# Patient Record
Sex: FEMALE | Race: WHITE | Employment: FULL TIME | URBAN - NONMETROPOLITAN AREA
[De-identification: names, ages, dates, MRNs, and addresses within clinical notes are randomized per-mention and may not be internally consistent; named-entity substitution may affect disease eponyms.]

---

## 2017-03-24 ENCOUNTER — OFFICE VISIT (OUTPATIENT)
Dept: FAMILY MEDICINE CLINIC | Age: 37
End: 2017-03-24

## 2017-03-24 VITALS
RESPIRATION RATE: 16 BRPM | SYSTOLIC BLOOD PRESSURE: 124 MMHG | DIASTOLIC BLOOD PRESSURE: 76 MMHG | WEIGHT: 250 LBS | HEART RATE: 72 BPM | BODY MASS INDEX: 38.58 KG/M2

## 2017-03-24 DIAGNOSIS — E03.9 ACQUIRED HYPOTHYROIDISM: Primary | ICD-10-CM

## 2017-03-24 DIAGNOSIS — R51.9 ACUTE NONINTRACTABLE HEADACHE, UNSPECIFIED HEADACHE TYPE: ICD-10-CM

## 2017-03-24 LAB
T4 FREE: 0.4 NG/DL (ref 0.9–1.8)
TSH REFLEX: 52.54 UIU/ML (ref 0.27–4.2)

## 2017-03-24 PROCEDURE — 99213 OFFICE O/P EST LOW 20 MIN: CPT | Performed by: NURSE PRACTITIONER

## 2017-03-24 PROCEDURE — 36415 COLL VENOUS BLD VENIPUNCTURE: CPT | Performed by: NURSE PRACTITIONER

## 2017-03-24 PROCEDURE — 96372 THER/PROPH/DIAG INJ SC/IM: CPT | Performed by: NURSE PRACTITIONER

## 2017-03-24 RX ORDER — KETOROLAC TROMETHAMINE 30 MG/ML
60 INJECTION, SOLUTION INTRAMUSCULAR; INTRAVENOUS ONCE
Status: COMPLETED | OUTPATIENT
Start: 2017-03-24 | End: 2017-03-24

## 2017-03-24 RX ORDER — ONDANSETRON HYDROCHLORIDE 8 MG/1
8 TABLET, FILM COATED ORAL DAILY PRN
Qty: 30 TABLET | Refills: 0 | Status: CANCELLED | OUTPATIENT
Start: 2017-03-24

## 2017-03-24 RX ADMIN — KETOROLAC TROMETHAMINE 60 MG: 30 INJECTION, SOLUTION INTRAMUSCULAR; INTRAVENOUS at 15:54

## 2017-03-24 ASSESSMENT — ENCOUNTER SYMPTOMS
PHOTOPHOBIA: 0
EYES NEGATIVE: 1
NAUSEA: 0
GASTROINTESTINAL NEGATIVE: 1
DIARRHEA: 0
VOMITING: 0
RESPIRATORY NEGATIVE: 1
CONSTIPATION: 0

## 2017-03-27 DIAGNOSIS — E03.9 ACQUIRED HYPOTHYROIDISM: ICD-10-CM

## 2017-03-27 RX ORDER — LEVOTHYROXINE SODIUM 0.15 MG/1
150 TABLET ORAL DAILY
Qty: 90 TABLET | Refills: 1 | Status: SHIPPED | OUTPATIENT
Start: 2017-03-27 | End: 2017-05-23 | Stop reason: SDUPTHER

## 2017-05-19 DIAGNOSIS — E03.9 ACQUIRED HYPOTHYROIDISM: ICD-10-CM

## 2017-05-19 LAB — TSH REFLEX: 0.05 UIU/ML (ref 0.27–4.2)

## 2017-05-20 LAB — T4 FREE: 1.8 NG/DL (ref 0.9–1.8)

## 2017-05-23 DIAGNOSIS — E03.9 ACQUIRED HYPOTHYROIDISM: ICD-10-CM

## 2017-05-23 RX ORDER — LEVOTHYROXINE SODIUM 137 UG/1
150 TABLET ORAL DAILY
Qty: 30 TABLET | Refills: 3 | Status: SHIPPED | OUTPATIENT
Start: 2017-05-23 | End: 2017-11-01 | Stop reason: SDUPTHER

## 2017-07-24 ENCOUNTER — TELEPHONE (OUTPATIENT)
Dept: FAMILY MEDICINE CLINIC | Age: 37
End: 2017-07-24

## 2017-09-25 DIAGNOSIS — E03.9 ACQUIRED HYPOTHYROIDISM: ICD-10-CM

## 2017-09-25 RX ORDER — LEVOTHYROXINE SODIUM 0.15 MG/1
TABLET ORAL
Qty: 30 TABLET | Refills: 1 | OUTPATIENT
Start: 2017-09-25

## 2017-11-01 DIAGNOSIS — E03.9 ACQUIRED HYPOTHYROIDISM: ICD-10-CM

## 2017-11-01 RX ORDER — LEVOTHYROXINE SODIUM 137 UG/1
150 TABLET ORAL DAILY
Qty: 30 TABLET | Refills: 0 | Status: SHIPPED | OUTPATIENT
Start: 2017-11-01 | End: 2017-12-10 | Stop reason: SDUPTHER

## 2017-11-01 NOTE — TELEPHONE ENCOUNTER
Patient needs to come in and have a TSH drawn sometime in the next 1-2 weeks.   She has not had this done since May

## 2017-11-21 DIAGNOSIS — E03.9 ACQUIRED HYPOTHYROIDISM: ICD-10-CM

## 2017-11-21 LAB
T4 FREE: 1.1 NG/DL (ref 0.9–1.8)
TSH REFLEX: 2.77 UIU/ML (ref 0.27–4.2)

## 2017-12-01 ENCOUNTER — OFFICE VISIT (OUTPATIENT)
Dept: FAMILY MEDICINE CLINIC | Age: 37
End: 2017-12-01

## 2017-12-01 VITALS
HEART RATE: 82 BPM | DIASTOLIC BLOOD PRESSURE: 66 MMHG | BODY MASS INDEX: 38.74 KG/M2 | OXYGEN SATURATION: 98 % | SYSTOLIC BLOOD PRESSURE: 102 MMHG | WEIGHT: 240 LBS | RESPIRATION RATE: 14 BRPM

## 2017-12-01 DIAGNOSIS — Z23 FLU VACCINE NEED: ICD-10-CM

## 2017-12-01 DIAGNOSIS — F41.9 ANXIETY: Primary | ICD-10-CM

## 2017-12-01 PROCEDURE — 99213 OFFICE O/P EST LOW 20 MIN: CPT | Performed by: NURSE PRACTITIONER

## 2017-12-01 PROCEDURE — 90471 IMMUNIZATION ADMIN: CPT | Performed by: NURSE PRACTITIONER

## 2017-12-01 PROCEDURE — 90686 IIV4 VACC NO PRSV 0.5 ML IM: CPT | Performed by: NURSE PRACTITIONER

## 2017-12-01 RX ORDER — HYDROXYZINE PAMOATE 25 MG/1
25 CAPSULE ORAL 4 TIMES DAILY PRN
Qty: 120 CAPSULE | Refills: 1 | Status: SHIPPED | OUTPATIENT
Start: 2017-12-01 | End: 2018-09-04

## 2017-12-01 ASSESSMENT — ENCOUNTER SYMPTOMS
EYE REDNESS: 0
CHEST TIGHTNESS: 0
COUGH: 0
EYES NEGATIVE: 1
EYE PAIN: 0
SINUS PRESSURE: 0
GASTROINTESTINAL NEGATIVE: 1
RESPIRATORY NEGATIVE: 1
SORE THROAT: 0
COLOR CHANGE: 0
BACK PAIN: 0
EYE DISCHARGE: 0
SHORTNESS OF BREATH: 0
RHINORRHEA: 0

## 2017-12-01 ASSESSMENT — PATIENT HEALTH QUESTIONNAIRE - PHQ9
2. FEELING DOWN, DEPRESSED OR HOPELESS: 0
1. LITTLE INTEREST OR PLEASURE IN DOING THINGS: 0
SUM OF ALL RESPONSES TO PHQ QUESTIONS 1-9: 0
SUM OF ALL RESPONSES TO PHQ9 QUESTIONS 1 & 2: 0

## 2017-12-01 NOTE — PROGRESS NOTES
SUBJECTIVE:    Abdi Ear  1980  43 y.o.  female      Chief Complaint   Patient presents with    6 Month Follow-Up    Hypothyroidism    Flu Vaccine     requesting flu vaccine     HPI     The patient is here today with concern for \"panic attacks\". She reports that she is just had a lot going on. Her  has had a drug problem, their son recently let them know that he is depressed and suicidal.  She has had some chest pain and shortness of breath with a normal workup in the emergency department recently. She is having what feels like a foggy feeling and then some shortness of breath that escalates into \"full-blown panic\" she says it can last anywhere from 5-10 minutes to as long as an hour or 2. No other concerns at this time        No problem-specific Assessment & Plan notes found for this encounter. Review of Systems   Constitutional: Negative. Negative for activity change, appetite change, fatigue and fever. HENT: Negative. Negative for congestion, ear pain, rhinorrhea, sinus pressure, sneezing and sore throat. Eyes: Negative. Negative for pain, discharge and redness. Respiratory: Negative. Negative for cough, chest tightness and shortness of breath. Cardiovascular: Negative. Gastrointestinal: Negative. Endocrine: Negative. Genitourinary: Negative. Negative for difficulty urinating and dysuria. Musculoskeletal: Negative. Negative for back pain, joint swelling and myalgias. Skin: Negative. Negative for color change and rash. Neurological: Negative. Negative for dizziness, weakness and headaches. Hematological: Negative. Does not bruise/bleed easily. Psychiatric/Behavioral: Positive for sleep disturbance. Negative for dysphoric mood. The patient is nervous/anxious. All other systems reviewed and are negative.       OBJECTIVE:    /66 (Site: Right Arm, Position: Sitting, Cuff Size: Large Adult)   Pulse 82   Resp 14   Wt 240 lb (108.9 kg) LMP 10/13/2017   SpO2 98%   BMI 38.74 kg/m²     Physical Exam   Constitutional: She is oriented to person, place, and time. She appears well-developed and well-nourished. No distress. HENT:   Head: Normocephalic and atraumatic. Mouth/Throat: Oropharynx is clear and moist.   Eyes: Conjunctivae and EOM are normal. Pupils are equal, round, and reactive to light. Neck: Normal range of motion. Neck supple. Cardiovascular: Normal rate, regular rhythm, normal heart sounds and intact distal pulses. Pulmonary/Chest: Effort normal and breath sounds normal. No respiratory distress. Abdominal: Soft. Bowel sounds are normal. She exhibits no distension. There is no tenderness. Musculoskeletal: Normal range of motion. Neurological: She is alert and oriented to person, place, and time. She has normal reflexes. Skin: Skin is warm and dry. Psychiatric: She has a normal mood and affect. Her behavior is normal. Judgment and thought content normal.       ASSESSMENT/PLAN:    1. Flu vaccine need  Given today  - INFLUENZA, QUADV, 3 YRS AND OLDER, IM, MDV, 0.5ML (FLUZONE QUADV)    2. Anxiety  I discussed with Jorgewilbertmehnaz Lamardavid her symptoms. She is reluctant to be on a medication every day as she feels this is just an intermittent problem. We discussed the options for treatment of intermittent anxiety. We have elected not to consider benzodiazepines due to the concern for drug use in her household. She is agreeable to trying Vistaril. We discussed that she can use this at night to help her sleep and it may also provide her a feeling of wellness the next day as it will continue to lasted decrease her anxiety. She is to follow-up in one month, she will follow up sooner if she has any worsening symptoms or other new concerns. - hydrOXYzine (VISTARIL) 25 MG capsule; Take 1 capsule by mouth 4 times daily as needed for Anxiety  Dispense: 120 capsule; Refill: 1      No Follow-up on file.             (Please note that portions of this note may have been completed with a voice recognition program. Efforts were made to edit the dictations but occasionally words are mis-transcribed.)

## 2017-12-01 NOTE — PATIENT INSTRUCTIONS
Behavioral Health Resources  1. [facility]  [address] [facility]. [address]  [phone]    [address] [facility], [address]  [phone]    2. Positive Perspective  [facility]  [address] [facility]  [place], [address]  [phone]    3. [address] [facility], [address]  [address]  [facility]  [address]  [phone]      4. [facility]   [address] [facility], [address]      5. [address] [facility]. [place] [address] [phone]  Offers psychiatric services and counseling    6. [address] [facility],[address]  [phone]  Offers medical & psychiatric services and counseling    7. Child, [address]  [address] [phone]  Offers psychiatric services and counseling    8.  [address] [address] [address]  [phone]

## 2017-12-10 DIAGNOSIS — E03.9 ACQUIRED HYPOTHYROIDISM: ICD-10-CM

## 2017-12-12 RX ORDER — LEVOTHYROXINE SODIUM 137 UG/1
150 TABLET ORAL DAILY
Qty: 30 TABLET | Refills: 0 | Status: SHIPPED | OUTPATIENT
Start: 2017-12-12 | End: 2018-02-07 | Stop reason: SDUPTHER

## 2018-01-03 ENCOUNTER — OFFICE VISIT (OUTPATIENT)
Dept: FAMILY MEDICINE CLINIC | Age: 38
End: 2018-01-03

## 2018-01-03 VITALS
HEART RATE: 83 BPM | OXYGEN SATURATION: 98 % | RESPIRATION RATE: 15 BRPM | WEIGHT: 241 LBS | DIASTOLIC BLOOD PRESSURE: 74 MMHG | BODY MASS INDEX: 38.9 KG/M2 | SYSTOLIC BLOOD PRESSURE: 126 MMHG

## 2018-01-03 DIAGNOSIS — Z01.419 ENCOUNTER FOR GYNECOLOGICAL EXAMINATION WITHOUT ABNORMAL FINDING: Primary | ICD-10-CM

## 2018-01-03 PROCEDURE — 99395 PREV VISIT EST AGE 18-39: CPT | Performed by: NURSE PRACTITIONER

## 2018-01-03 ASSESSMENT — ENCOUNTER SYMPTOMS
WHEEZING: 0
SHORTNESS OF BREATH: 0
GASTROINTESTINAL NEGATIVE: 1
COUGH: 0
RESPIRATORY NEGATIVE: 1

## 2018-01-03 NOTE — PROGRESS NOTES
SUBJECTIVE:    Destiney Diaz  1980  42 y.o.  female      Chief Complaint   Patient presents with    Gynecologic Exam     HPI  Routine Gyn Exam: Patient here for routine exam.  Current Complaints: none. Personal Health Questionnaire Reviewed: not asked     Gynecologic History  Patient's last menstrual period was 2017 (within days). Contraception: tubal ligation  Last Pap: 1.5 years  Results: normal  Last Mammogram: na  Results: na    Obstetric History  : 6  Para: 3  AB: 2   x 1    No problem-specific Assessment & Plan notes found for this encounter. Review of Systems   Constitutional: Negative. Respiratory: Negative. Negative for cough, shortness of breath and wheezing. Cardiovascular: Negative. Negative for chest pain, palpitations and leg swelling. Gastrointestinal: Negative. Endocrine: Negative. Genitourinary: Negative. Negative for difficulty urinating, dyspareunia, genital sores, menstrual problem, pelvic pain, vaginal bleeding, vaginal discharge and vaginal pain. Neurological: Negative. All other systems reviewed and are negative. OBJECTIVE:    /74 (Site: Right Arm, Position: Sitting, Cuff Size: Large Adult)   Pulse 83   Resp 15   Wt 241 lb (109.3 kg)   LMP 2017 (Within Days)   SpO2 98%   BMI 38.90 kg/m²     Physical Exam   Constitutional: She is oriented to person, place, and time. She appears well-developed and well-nourished. HENT:   Head: Normocephalic. Cardiovascular: Normal rate, normal heart sounds and intact distal pulses. No murmur heard. Pulmonary/Chest: Effort normal and breath sounds normal. No respiratory distress. Abdominal: Soft. Bowel sounds are normal. She exhibits no distension and no mass. There is no tenderness. There is no rebound and no guarding. Hernia confirmed negative in the right inguinal area and confirmed negative in the left inguinal area.    Genitourinary: Vagina normal and uterus normal.

## 2018-01-04 LAB
CANDIDA SPECIES, DNA PROBE: NORMAL
GARDNERELLA VAGINALIS, DNA PROBE: NORMAL
TRICHOMONAS VAGINALIS DNA: NORMAL

## 2018-03-01 DIAGNOSIS — E03.9 ACQUIRED HYPOTHYROIDISM: ICD-10-CM

## 2018-03-01 RX ORDER — LEVOTHYROXINE SODIUM 137 UG/1
150 TABLET ORAL DAILY
Qty: 90 TABLET | Refills: 1 | Status: SHIPPED | OUTPATIENT
Start: 2018-03-01 | End: 2019-01-31 | Stop reason: SDUPTHER

## 2018-09-04 ENCOUNTER — OFFICE VISIT (OUTPATIENT)
Dept: FAMILY MEDICINE CLINIC | Age: 38
End: 2018-09-04

## 2018-09-04 VITALS
DIASTOLIC BLOOD PRESSURE: 68 MMHG | HEART RATE: 91 BPM | SYSTOLIC BLOOD PRESSURE: 102 MMHG | BODY MASS INDEX: 38.61 KG/M2 | OXYGEN SATURATION: 98 % | RESPIRATION RATE: 16 BRPM | WEIGHT: 239.2 LBS

## 2018-09-04 DIAGNOSIS — N92.6 MISSED PERIOD: Primary | ICD-10-CM

## 2018-09-04 DIAGNOSIS — E03.9 ACQUIRED HYPOTHYROIDISM: ICD-10-CM

## 2018-09-04 DIAGNOSIS — R53.83 FATIGUE, UNSPECIFIED TYPE: ICD-10-CM

## 2018-09-04 LAB
CONTROL: NORMAL
HCT VFR BLD CALC: 42.3 % (ref 36–48)
HEMOGLOBIN: 14.1 G/DL (ref 12–16)
MCH RBC QN AUTO: 30.9 PG (ref 26–34)
MCHC RBC AUTO-ENTMCNC: 33.3 G/DL (ref 31–36)
MCV RBC AUTO: 92.8 FL (ref 80–100)
PDW BLD-RTO: 14.2 % (ref 12.4–15.4)
PLATELET # BLD: 267 K/UL (ref 135–450)
PMV BLD AUTO: 9.9 FL (ref 5–10.5)
PREGNANCY TEST URINE, POC: NEGATIVE
RBC # BLD: 4.55 M/UL (ref 4–5.2)
TSH REFLEX: 0.62 UIU/ML (ref 0.27–4.2)
WBC # BLD: 7.4 K/UL (ref 4–11)

## 2018-09-04 PROCEDURE — 81025 URINE PREGNANCY TEST: CPT | Performed by: NURSE PRACTITIONER

## 2018-09-04 PROCEDURE — 99213 OFFICE O/P EST LOW 20 MIN: CPT | Performed by: NURSE PRACTITIONER

## 2018-09-04 ASSESSMENT — ENCOUNTER SYMPTOMS
ABDOMINAL PAIN: 0
WHEEZING: 0
SHORTNESS OF BREATH: 0
VOMITING: 1
RESPIRATORY NEGATIVE: 1
COUGH: 0
CONSTIPATION: 0
DIARRHEA: 0
NAUSEA: 1

## 2018-09-05 ENCOUNTER — TELEPHONE (OUTPATIENT)
Dept: FAMILY MEDICINE CLINIC | Age: 38
End: 2018-09-05

## 2018-09-05 DIAGNOSIS — N92.6 MISSED PERIOD: Primary | ICD-10-CM

## 2018-09-05 DIAGNOSIS — N91.2 AMENORRHEA: Primary | ICD-10-CM

## 2018-09-05 LAB — GONADOTROPIN, CHORIONIC (HCG) QUANT: <5 MIU/ML

## 2018-09-05 NOTE — TELEPHONE ENCOUNTER
[facility] per Ludmila's order-labs that were ordered yesterday were missed-called and spoke w/angel-requesting HCG quanitative to be added to lab that was drawn yesterday-she verbalizes understanding-she request the order be in epic and they will release order from there

## 2018-09-13 DIAGNOSIS — N91.2 AMENORRHEA: ICD-10-CM

## 2018-09-13 LAB
FOLLICLE STIMULATING HORMONE: 4.1 MIU/ML
GONADOTROPIN, CHORIONIC (HCG) QUANT: <5 MIU/ML
LUTEINIZING HORMONE: 9.7 MIU/ML

## 2018-09-18 ENCOUNTER — HOSPITAL ENCOUNTER (OUTPATIENT)
Dept: ULTRASOUND IMAGING | Age: 38
Discharge: HOME | End: 2018-09-18
Attending: NURSE PRACTITIONER | Admitting: NURSE PRACTITIONER

## 2018-09-18 DIAGNOSIS — N91.2 AMENORRHEA: ICD-10-CM

## 2019-01-31 DIAGNOSIS — E03.9 ACQUIRED HYPOTHYROIDISM: ICD-10-CM

## 2019-01-31 RX ORDER — LEVOTHYROXINE SODIUM 137 UG/1
150 TABLET ORAL DAILY
Qty: 90 TABLET | Refills: 1 | Status: SHIPPED | OUTPATIENT
Start: 2019-01-31 | End: 2019-08-28 | Stop reason: SDUPTHER

## 2019-02-21 ENCOUNTER — NURSE ONLY (OUTPATIENT)
Dept: FAMILY MEDICINE CLINIC | Age: 39
End: 2019-02-21

## 2019-02-21 DIAGNOSIS — Z23 NEED FOR IMMUNIZATION AGAINST PERTUSSIS: Primary | ICD-10-CM

## 2019-02-21 DIAGNOSIS — Z23 NEED FOR PROPHYLACTIC VACCINATION AND INOCULATION AGAINST INFLUENZA: ICD-10-CM

## 2019-02-21 PROCEDURE — 90471 IMMUNIZATION ADMIN: CPT | Performed by: NURSE PRACTITIONER

## 2019-02-21 PROCEDURE — 90686 IIV4 VACC NO PRSV 0.5 ML IM: CPT | Performed by: NURSE PRACTITIONER

## 2019-02-21 PROCEDURE — 90472 IMMUNIZATION ADMIN EACH ADD: CPT | Performed by: NURSE PRACTITIONER

## 2019-02-21 PROCEDURE — 90715 TDAP VACCINE 7 YRS/> IM: CPT | Performed by: NURSE PRACTITIONER

## 2019-08-28 DIAGNOSIS — E03.9 ACQUIRED HYPOTHYROIDISM: ICD-10-CM

## 2019-08-28 RX ORDER — LEVOTHYROXINE SODIUM 137 UG/1
150 TABLET ORAL DAILY
Qty: 90 TABLET | Refills: 1 | Status: SHIPPED | OUTPATIENT
Start: 2019-08-28 | End: 2020-02-07 | Stop reason: DRUGHIGH

## 2020-02-06 ENCOUNTER — OFFICE VISIT (OUTPATIENT)
Dept: FAMILY MEDICINE CLINIC | Age: 40
End: 2020-02-06

## 2020-02-06 VITALS
TEMPERATURE: 98.3 F | DIASTOLIC BLOOD PRESSURE: 80 MMHG | SYSTOLIC BLOOD PRESSURE: 120 MMHG | HEART RATE: 77 BPM | WEIGHT: 254.25 LBS | OXYGEN SATURATION: 95 % | RESPIRATION RATE: 16 BRPM | BODY MASS INDEX: 40.86 KG/M2 | HEIGHT: 66 IN

## 2020-02-06 LAB
A/G RATIO: 1.3 (ref 1.1–2.2)
ALBUMIN SERPL-MCNC: 4.3 G/DL (ref 3.4–5)
ALP BLD-CCNC: 62 U/L (ref 40–129)
ALT SERPL-CCNC: 27 U/L (ref 10–40)
ANION GAP SERPL CALCULATED.3IONS-SCNC: 12 MMOL/L (ref 3–16)
AST SERPL-CCNC: 24 U/L (ref 15–37)
BILIRUB SERPL-MCNC: 0.3 MG/DL (ref 0–1)
BUN BLDV-MCNC: 5 MG/DL (ref 7–20)
CALCIUM SERPL-MCNC: 9.3 MG/DL (ref 8.3–10.6)
CHLORIDE BLD-SCNC: 101 MMOL/L (ref 99–110)
CHOLESTEROL, FASTING: 212 MG/DL (ref 0–199)
CO2: 24 MMOL/L (ref 21–32)
CREAT SERPL-MCNC: 0.8 MG/DL (ref 0.6–1.1)
GFR AFRICAN AMERICAN: >60
GFR NON-AFRICAN AMERICAN: >60
GLOBULIN: 3.4 G/DL
GLUCOSE FASTING: 87 MG/DL (ref 70–99)
HDLC SERPL-MCNC: 31 MG/DL (ref 40–60)
LDL CHOLESTEROL CALCULATED: ABNORMAL MG/DL
LDL CHOLESTEROL DIRECT: 113 MG/DL
POTASSIUM SERPL-SCNC: 4.2 MMOL/L (ref 3.5–5.1)
SODIUM BLD-SCNC: 137 MMOL/L (ref 136–145)
T4 FREE: 0.4 NG/DL (ref 0.9–1.8)
TOTAL PROTEIN: 7.7 G/DL (ref 6.4–8.2)
TRIGLYCERIDE, FASTING: 340 MG/DL (ref 0–150)
TSH SERPL DL<=0.05 MIU/L-ACNC: 51.87 UIU/ML (ref 0.27–4.2)
VLDLC SERPL CALC-MCNC: ABNORMAL MG/DL

## 2020-02-06 PROCEDURE — 99213 OFFICE O/P EST LOW 20 MIN: CPT | Performed by: NURSE PRACTITIONER

## 2020-02-06 PROCEDURE — 36415 COLL VENOUS BLD VENIPUNCTURE: CPT | Performed by: NURSE PRACTITIONER

## 2020-02-06 RX ORDER — LEVOTHYROXINE SODIUM 137 UG/1
150 TABLET ORAL DAILY
Qty: 90 TABLET | Refills: 1 | Status: CANCELLED | OUTPATIENT
Start: 2020-02-06

## 2020-02-06 ASSESSMENT — PATIENT HEALTH QUESTIONNAIRE - PHQ9
SUM OF ALL RESPONSES TO PHQ QUESTIONS 1-9: 0
1. LITTLE INTEREST OR PLEASURE IN DOING THINGS: 0
2. FEELING DOWN, DEPRESSED OR HOPELESS: 0
SUM OF ALL RESPONSES TO PHQ9 QUESTIONS 1 & 2: 0
SUM OF ALL RESPONSES TO PHQ QUESTIONS 1-9: 0

## 2020-02-06 ASSESSMENT — ENCOUNTER SYMPTOMS
GASTROINTESTINAL NEGATIVE: 1
EYES NEGATIVE: 1
RESPIRATORY NEGATIVE: 1

## 2020-02-07 RX ORDER — LEVOTHYROXINE SODIUM 0.15 MG/1
150 TABLET ORAL DAILY
Qty: 30 TABLET | Refills: 1 | Status: SHIPPED | OUTPATIENT
Start: 2020-02-07 | End: 2020-03-02

## 2020-03-02 RX ORDER — LEVOTHYROXINE SODIUM 0.15 MG/1
TABLET ORAL
Qty: 30 TABLET | Refills: 1 | Status: SHIPPED | OUTPATIENT
Start: 2020-03-02 | End: 2020-03-19 | Stop reason: SDUPTHER

## 2020-03-19 RX ORDER — LEVOTHYROXINE SODIUM 0.15 MG/1
TABLET ORAL
Qty: 90 TABLET | Refills: 0 | Status: SHIPPED | OUTPATIENT
Start: 2020-03-19 | End: 2020-06-26

## 2020-06-26 RX ORDER — LEVOTHYROXINE SODIUM 0.15 MG/1
TABLET ORAL
Qty: 90 TABLET | Refills: 0 | Status: SHIPPED | OUTPATIENT
Start: 2020-06-26 | End: 2022-02-24 | Stop reason: DRUGHIGH

## 2022-02-18 ENCOUNTER — OFFICE VISIT (OUTPATIENT)
Dept: FAMILY MEDICINE CLINIC | Age: 42
End: 2022-02-18
Payer: COMMERCIAL

## 2022-02-18 VITALS
HEIGHT: 66 IN | WEIGHT: 252.6 LBS | OXYGEN SATURATION: 97 % | HEART RATE: 76 BPM | TEMPERATURE: 97.2 F | DIASTOLIC BLOOD PRESSURE: 72 MMHG | BODY MASS INDEX: 40.6 KG/M2 | SYSTOLIC BLOOD PRESSURE: 115 MMHG | RESPIRATION RATE: 14 BRPM

## 2022-02-18 DIAGNOSIS — Z00.01 ENCOUNTER FOR HEALTH MAINTENANCE EXAMINATION WITH ABNORMAL FINDINGS: Primary | ICD-10-CM

## 2022-02-18 DIAGNOSIS — Z12.31 ENCOUNTER FOR SCREENING MAMMOGRAM FOR MALIGNANT NEOPLASM OF BREAST: ICD-10-CM

## 2022-02-18 DIAGNOSIS — E03.9 ACQUIRED HYPOTHYROIDISM: ICD-10-CM

## 2022-02-18 DIAGNOSIS — Z80.0 FAMILY HX OF COLON CANCER: ICD-10-CM

## 2022-02-18 DIAGNOSIS — Z11.59 NEED FOR HEPATITIS C SCREENING TEST: ICD-10-CM

## 2022-02-18 DIAGNOSIS — D49.2 ABNORMAL SKIN GROWTH: ICD-10-CM

## 2022-02-18 DIAGNOSIS — Z12.11 SCREENING FOR COLON CANCER: ICD-10-CM

## 2022-02-18 DIAGNOSIS — E04.1 LEFT THYROID NODULE: ICD-10-CM

## 2022-02-18 PROCEDURE — 99396 PREV VISIT EST AGE 40-64: CPT | Performed by: NURSE PRACTITIONER

## 2022-02-18 PROCEDURE — 99214 OFFICE O/P EST MOD 30 MIN: CPT | Performed by: NURSE PRACTITIONER

## 2022-02-18 ASSESSMENT — PATIENT HEALTH QUESTIONNAIRE - PHQ9
10. IF YOU CHECKED OFF ANY PROBLEMS, HOW DIFFICULT HAVE THESE PROBLEMS MADE IT FOR YOU TO DO YOUR WORK, TAKE CARE OF THINGS AT HOME, OR GET ALONG WITH OTHER PEOPLE: 0
6. FEELING BAD ABOUT YOURSELF - OR THAT YOU ARE A FAILURE OR HAVE LET YOURSELF OR YOUR FAMILY DOWN: 0
SUM OF ALL RESPONSES TO PHQ QUESTIONS 1-9: 2
4. FEELING TIRED OR HAVING LITTLE ENERGY: 1
SUM OF ALL RESPONSES TO PHQ9 QUESTIONS 1 & 2: 0
2. FEELING DOWN, DEPRESSED OR HOPELESS: 0
7. TROUBLE CONCENTRATING ON THINGS, SUCH AS READING THE NEWSPAPER OR WATCHING TELEVISION: 1
SUM OF ALL RESPONSES TO PHQ QUESTIONS 1-9: 2
9. THOUGHTS THAT YOU WOULD BE BETTER OFF DEAD, OR OF HURTING YOURSELF: 0
SUM OF ALL RESPONSES TO PHQ QUESTIONS 1-9: 2
5. POOR APPETITE OR OVEREATING: 0
8. MOVING OR SPEAKING SO SLOWLY THAT OTHER PEOPLE COULD HAVE NOTICED. OR THE OPPOSITE, BEING SO FIGETY OR RESTLESS THAT YOU HAVE BEEN MOVING AROUND A LOT MORE THAN USUAL: 0
SUM OF ALL RESPONSES TO PHQ QUESTIONS 1-9: 2
1. LITTLE INTEREST OR PLEASURE IN DOING THINGS: 0

## 2022-02-18 ASSESSMENT — ANXIETY QUESTIONNAIRES
1. FEELING NERVOUS, ANXIOUS, OR ON EDGE: 0
3. WORRYING TOO MUCH ABOUT DIFFERENT THINGS: 0
IF YOU CHECKED OFF ANY PROBLEMS ON THIS QUESTIONNAIRE, HOW DIFFICULT HAVE THESE PROBLEMS MADE IT FOR YOU TO DO YOUR WORK, TAKE CARE OF THINGS AT HOME, OR GET ALONG WITH OTHER PEOPLE: SOMEWHAT DIFFICULT
4. TROUBLE RELAXING: 1
6. BECOMING EASILY ANNOYED OR IRRITABLE: 0
7. FEELING AFRAID AS IF SOMETHING AWFUL MIGHT HAPPEN: 0
GAD7 TOTAL SCORE: 1
5. BEING SO RESTLESS THAT IT IS HARD TO SIT STILL: 0
2. NOT BEING ABLE TO STOP OR CONTROL WORRYING: 0
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
## 2022-02-21 ENCOUNTER — OFFICE VISIT (OUTPATIENT)
Dept: SURGERY | Age: 42
End: 2022-02-21

## 2022-02-21 VITALS
BODY MASS INDEX: 40.5 KG/M2 | WEIGHT: 252 LBS | HEART RATE: 80 BPM | OXYGEN SATURATION: 97 % | HEIGHT: 66 IN | SYSTOLIC BLOOD PRESSURE: 120 MMHG | DIASTOLIC BLOOD PRESSURE: 78 MMHG

## 2022-02-21 DIAGNOSIS — Z01.812 ENCOUNTER FOR PREPROCEDURE SCREENING LABORATORY TESTING FOR COVID-19: ICD-10-CM

## 2022-02-21 DIAGNOSIS — Z80.0 FAMILY HISTORY OF COLON CANCER: ICD-10-CM

## 2022-02-21 DIAGNOSIS — Z20.822 ENCOUNTER FOR PREPROCEDURE SCREENING LABORATORY TESTING FOR COVID-19: ICD-10-CM

## 2022-02-21 DIAGNOSIS — Z12.11 ENCOUNTER FOR SCREENING COLONOSCOPY: Primary | ICD-10-CM

## 2022-02-21 PROCEDURE — 99999 PR OFFICE/OUTPT VISIT,PROCEDURE ONLY: CPT | Performed by: SURGERY

## 2022-02-21 RX ORDER — SOD SULF/POT CHLORIDE/MAG SULF 1.479 G
24 TABLET ORAL ONCE
Qty: 24 TABLET | Refills: 0 | Status: SHIPPED | OUTPATIENT
Start: 2022-02-21 | End: 2022-02-21

## 2022-02-21 RX ORDER — SODIUM CHLORIDE 0.9 % (FLUSH) 0.9 %
5-40 SYRINGE (ML) INJECTION PRN
Status: CANCELLED | OUTPATIENT
Start: 2022-02-21

## 2022-02-21 RX ORDER — SODIUM CHLORIDE 9 MG/ML
25 INJECTION, SOLUTION INTRAVENOUS PRN
Status: CANCELLED | OUTPATIENT
Start: 2022-02-21

## 2022-02-21 RX ORDER — SODIUM CHLORIDE, SODIUM LACTATE, POTASSIUM CHLORIDE, CALCIUM CHLORIDE 600; 310; 30; 20 MG/100ML; MG/100ML; MG/100ML; MG/100ML
INJECTION, SOLUTION INTRAVENOUS CONTINUOUS
Status: CANCELLED | OUTPATIENT
Start: 2022-02-21

## 2022-02-21 RX ORDER — SODIUM CHLORIDE 0.9 % (FLUSH) 0.9 %
5-40 SYRINGE (ML) INJECTION EVERY 12 HOURS SCHEDULED
Status: CANCELLED | OUTPATIENT
Start: 2022-02-21

## 2022-02-21 ASSESSMENT — ENCOUNTER SYMPTOMS
VOMITING: 0
EYES NEGATIVE: 1
CHEST TIGHTNESS: 0
VOMITING: 0
SHORTNESS OF BREATH: 0
EYE REDNESS: 0
CONSTIPATION: 0
GASTROINTESTINAL NEGATIVE: 1
RHINORRHEA: 0
RESPIRATORY NEGATIVE: 1
NAUSEA: 0
SORE THROAT: 0
CHOKING: 0
EYE DISCHARGE: 0
SINUS PAIN: 0
ABDOMINAL DISTENTION: 0
SHORTNESS OF BREATH: 0
NAUSEA: 0
DIARRHEA: 0
SINUS PRESSURE: 0
ABDOMINAL PAIN: 0
DIARRHEA: 0
COLOR CHANGE: 0
WHEEZING: 0
TROUBLE SWALLOWING: 0
SORE THROAT: 0
VOICE CHANGE: 0
ABDOMINAL PAIN: 0
CHEST TIGHTNESS: 0

## 2022-02-21 ASSESSMENT — PATIENT HEALTH QUESTIONNAIRE - PHQ9
SUM OF ALL RESPONSES TO PHQ QUESTIONS 1-9: 0
2. FEELING DOWN, DEPRESSED OR HOPELESS: 0
SUM OF ALL RESPONSES TO PHQ9 QUESTIONS 1 & 2: 0
1. LITTLE INTEREST OR PLEASURE IN DOING THINGS: 0
SUM OF ALL RESPONSES TO PHQ QUESTIONS 1-9: 0

## 2022-02-21 NOTE — Clinical Note
I put in a case request for [facility], but this patient is ok with either AKOSUA or Sonal galan. I told her I'm not 100% sure that her insurance will approve the colonoscopy since she isn't 37 yet, but she does have a family hx of colon cancer, so we'll see. Thanks!
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
## 2022-02-21 NOTE — LETTER
[facility]  [address]  Phone: [phone]  Fax: [phone]    Kaylah Shields MD    February 21, 2022     WINNIE Thompson - CNP  [address]    Patient: William Nagel   MR Number: [id number]   YOB: 1980   Date of Visit: 2/21/2022       Dear Maite Medina: Thank you for referring Ayesha Rivas to me for evaluation/treatment. Below are the relevant portions of my assessment and plan of care. If you have questions, please do not hesitate to call me. I look forward to following Apple Arriaza along with you.     Sincerely,      Kaylah Shields MD

## 2022-02-21 NOTE — LETTER
[facility]  [address] [place] [address]  Phone: [phone]  Fax: [phone]           Placido Chang MD      February 21, 2022     Patient: Elena Niño   MR Number: [id number]   YOB: 1980   Date of Visit: 2/21/2022       Dear Dr. Butler Fort: Thank you for referring Elli Romero to me for evaluation/treatment. Below are the relevant portions of my assessment and plan of care. If you have questions, please do not hesitate to call me. I look forward to following Dominga Skelton along with you.     Sincerely,        Placido Chang MD    CC providers:  WINNIE Holt - CNP  [address] [address]  [address] [place]

## 2022-02-22 ENCOUNTER — TELEPHONE (OUTPATIENT)
Dept: SURGERY | Age: 42
End: 2022-02-22

## 2022-02-22 PROBLEM — Z12.11 ENCOUNTER FOR SCREENING COLONOSCOPY: Status: ACTIVE | Noted: 2022-02-22

## 2022-02-22 PROBLEM — Z80.0 FAMILY HISTORY OF COLON CANCER: Status: ACTIVE | Noted: 2022-02-22

## 2022-02-22 NOTE — TELEPHONE ENCOUNTER
SPOKE TO  [address] ([address] [facility]) SCHEDULED @ [employer].  NOTIFIED OF DATES, TIMES AND LOCATION    PHONE ASSESSMENT   COVID - 3/16/22 @ Jose 8-12  SURGERY - 3/23/22 @ 900  P/O - 3/28/22 @ 100    NPO AFTER MIDNIGHT  SUPREP  CLEAR LIQUIDS STARTING 3/22/22  1ST BOTTLE 4PM  2ND BOTTLE 4AM    HOLD BLOOD THINNERS

## 2022-02-23 ENCOUNTER — HOSPITAL ENCOUNTER (OUTPATIENT)
Age: 42
Discharge: HOME | End: 2022-02-23
Payer: COMMERCIAL

## 2022-02-23 ENCOUNTER — HOSPITAL ENCOUNTER (OUTPATIENT)
Dept: ULTRASOUND IMAGING | Age: 42
Discharge: HOME | End: 2022-02-23
Payer: COMMERCIAL

## 2022-02-23 DIAGNOSIS — E04.1 LEFT THYROID NODULE: ICD-10-CM

## 2022-02-23 LAB
ALBUMIN SERPL-MCNC: 4.1 GM/DL (ref 3.4–5)
ALP BLD-CCNC: 51 IU/L (ref 40–129)
ALT SERPL-CCNC: 23 U/L (ref 10–40)
ANION GAP SERPL CALCULATED.3IONS-SCNC: 7 MMOL/L (ref 4–16)
AST SERPL-CCNC: 16 IU/L (ref 15–37)
BASOPHILS ABSOLUTE: 0.1 K/CU MM
BASOPHILS RELATIVE PERCENT: 1.2 % (ref 0–1)
BILIRUB SERPL-MCNC: 0.5 MG/DL (ref 0–1)
BUN BLDV-MCNC: 9 MG/DL (ref 6–23)
CALCIUM SERPL-MCNC: 8.8 MG/DL (ref 8.3–10.6)
CHLORIDE BLD-SCNC: 103 MMOL/L (ref 99–110)
CHOLESTEROL, FASTING: 185 MG/DL
CO2: 27 MMOL/L (ref 21–32)
CREAT SERPL-MCNC: 0.9 MG/DL (ref 0.6–1.1)
DIFFERENTIAL TYPE: ABNORMAL
EOSINOPHILS ABSOLUTE: 0.3 K/CU MM
EOSINOPHILS RELATIVE PERCENT: 4.6 % (ref 0–3)
GFR AFRICAN AMERICAN: >60 ML/MIN/1.73M2
GFR NON-AFRICAN AMERICAN: >60 ML/MIN/1.73M2
GLUCOSE FASTING: 85 MG/DL (ref 70–99)
HCT VFR BLD CALC: 42.6 % (ref 37–47)
HDLC SERPL-MCNC: 33 MG/DL
HEMOGLOBIN: 14 GM/DL (ref 12.5–16)
HEPATITIS C ANTIBODY: NON REACTIVE
IMMATURE NEUTROPHIL %: 0.3 % (ref 0–0.43)
LDL CHOLESTEROL CALCULATED: 104 MG/DL
LYMPHOCYTES ABSOLUTE: 2.1 K/CU MM
LYMPHOCYTES RELATIVE PERCENT: 28.3 % (ref 24–44)
MCH RBC QN AUTO: 31 PG (ref 27–31)
MCHC RBC AUTO-ENTMCNC: 32.9 % (ref 32–36)
MCV RBC AUTO: 94.5 FL (ref 78–100)
MONOCYTES ABSOLUTE: 0.8 K/CU MM
MONOCYTES RELATIVE PERCENT: 10.2 % (ref 0–4)
PDW BLD-RTO: 13.5 % (ref 11.7–14.9)
PLATELET # BLD: 302 K/CU MM (ref 140–440)
PMV BLD AUTO: 11.3 FL (ref 7.5–11.1)
POTASSIUM SERPL-SCNC: 4.2 MMOL/L (ref 3.5–5.1)
RBC # BLD: 4.51 M/CU MM (ref 4.2–5.4)
SEGMENTED NEUTROPHILS ABSOLUTE COUNT: 4.1 K/CU MM
SEGMENTED NEUTROPHILS RELATIVE PERCENT: 55.4 % (ref 36–66)
SODIUM BLD-SCNC: 137 MMOL/L (ref 135–145)
T4 FREE: 0.47 NG/DL (ref 0.9–1.8)
TOTAL IMMATURE NEUTOROPHIL: 0.02 K/CU MM
TOTAL PROTEIN: 6.5 GM/DL (ref 6.4–8.2)
TRIGLYCERIDE, FASTING: 238 MG/DL
TSH HIGH SENSITIVITY: 35.78 UIU/ML (ref 0.27–4.2)
WBC # BLD: 7.4 K/CU MM (ref 4–10.5)

## 2022-02-23 PROCEDURE — 80061 LIPID PANEL: CPT

## 2022-02-23 PROCEDURE — 76536 US EXAM OF HEAD AND NECK: CPT

## 2022-02-23 PROCEDURE — 36415 COLL VENOUS BLD VENIPUNCTURE: CPT

## 2022-02-23 PROCEDURE — 84445 ASSAY OF TSI GLOBULIN: CPT

## 2022-02-23 PROCEDURE — 86803 HEPATITIS C AB TEST: CPT

## 2022-02-23 PROCEDURE — 80053 COMPREHEN METABOLIC PANEL: CPT

## 2022-02-23 PROCEDURE — 84439 ASSAY OF FREE THYROXINE: CPT

## 2022-02-23 PROCEDURE — 84443 ASSAY THYROID STIM HORMONE: CPT

## 2022-02-23 PROCEDURE — 86800 THYROGLOBULIN ANTIBODY: CPT

## 2022-02-23 PROCEDURE — 86376 MICROSOMAL ANTIBODY EACH: CPT

## 2022-02-23 PROCEDURE — 85025 COMPLETE CBC W/AUTO DIFF WBC: CPT

## 2022-02-24 DIAGNOSIS — E04.2 MULTIPLE THYROID NODULES: ICD-10-CM

## 2022-02-24 DIAGNOSIS — E03.9 ACQUIRED HYPOTHYROIDISM: Primary | ICD-10-CM

## 2022-02-24 RX ORDER — LEVOTHYROXINE SODIUM 175 UG/1
175 TABLET ORAL DAILY
Qty: 30 TABLET | Refills: 2 | Status: SHIPPED | OUTPATIENT
Start: 2022-02-24 | End: 2022-03-21

## 2022-02-25 LAB
ANTITHYROGLOBULIN AB: 36 IU/ML (ref 0–4)
ANTITHYROID MICORSOMAL: 276.4 IU/ML (ref 0–9)

## 2022-02-27 LAB — THYROID STIMULATING IMMUNOGLOBULIN: <0.1 IU/L

## 2022-03-03 ENCOUNTER — TELEPHONE (OUTPATIENT)
Dept: SURGERY | Age: 42
End: 2022-03-03

## 2022-03-03 NOTE — TELEPHONE ENCOUNTER
Need to speak with patient on colonoscopy. Insurance not approving. Does not meet medical requirements. Spoke w/ insurance company can try and get approved as diagnostic and not screening due to request, but would be applied to deductible and be out of pocket. Spoke w/ Dr. Gwen Delong and if patient doesn't want to proceed with diagnostic colonoscopy can get a cologuard test from PCP.    Left message for patient to return my call

## 2022-03-03 NOTE — TELEPHONE ENCOUNTER
Spoke with patient she will call insurance company to discuss and then probably do the cologuard test. She wants to cancel at this time. Surgery scheduler notified.

## 2022-03-20 DIAGNOSIS — E03.9 ACQUIRED HYPOTHYROIDISM: ICD-10-CM

## 2022-03-21 RX ORDER — LEVOTHYROXINE SODIUM 175 UG/1
TABLET ORAL
Qty: 30 TABLET | Refills: 0 | Status: SHIPPED | OUTPATIENT
Start: 2022-03-21 | End: 2022-04-21

## 2022-03-24 PROBLEM — Z12.11 ENCOUNTER FOR SCREENING COLONOSCOPY: Status: RESOLVED | Noted: 2022-02-22 | Resolved: 2022-03-24

## 2022-04-20 DIAGNOSIS — E03.9 ACQUIRED HYPOTHYROIDISM: ICD-10-CM

## 2022-04-21 RX ORDER — LEVOTHYROXINE SODIUM 175 UG/1
TABLET ORAL
Qty: 30 TABLET | Refills: 0 | Status: SHIPPED | OUTPATIENT
Start: 2022-04-21 | End: 2022-05-17 | Stop reason: SDUPTHER

## 2022-05-12 ENCOUNTER — TELEPHONE (OUTPATIENT)
Dept: FAMILY MEDICINE CLINIC | Age: 42
End: 2022-05-12

## 2022-05-12 DIAGNOSIS — E03.9 ACQUIRED HYPOTHYROIDISM: Primary | ICD-10-CM

## 2022-05-12 DIAGNOSIS — E04.2 MULTIPLE THYROID NODULES: ICD-10-CM

## 2022-05-17 DIAGNOSIS — E03.9 ACQUIRED HYPOTHYROIDISM: ICD-10-CM

## 2022-05-17 RX ORDER — LEVOTHYROXINE SODIUM 175 UG/1
TABLET ORAL
Qty: 30 TABLET | Refills: 0 | Status: SHIPPED | OUTPATIENT
Start: 2022-05-17 | End: 2022-06-23 | Stop reason: DRUGHIGH

## 2022-05-21 DIAGNOSIS — E03.9 ACQUIRED HYPOTHYROIDISM: ICD-10-CM

## 2022-05-23 RX ORDER — LEVOTHYROXINE SODIUM 175 UG/1
TABLET ORAL
Qty: 30 TABLET | Refills: 0 | OUTPATIENT
Start: 2022-05-23

## 2022-06-20 ENCOUNTER — HOSPITAL ENCOUNTER (OUTPATIENT)
Age: 42
Discharge: HOME | End: 2022-06-20
Payer: COMMERCIAL

## 2022-06-20 LAB
ALBUMIN SERPL-MCNC: 4.1 GM/DL (ref 3.4–5)
ALP BLD-CCNC: 61 IU/L (ref 40–129)
ALT SERPL-CCNC: 23 U/L (ref 10–40)
ANION GAP SERPL CALCULATED.3IONS-SCNC: 12 MMOL/L (ref 4–16)
AST SERPL-CCNC: 19 IU/L (ref 15–37)
BASOPHILS ABSOLUTE: 0.1 K/CU MM
BASOPHILS RELATIVE PERCENT: 0.7 % (ref 0–1)
BILIRUB SERPL-MCNC: 0.4 MG/DL (ref 0–1)
BUN BLDV-MCNC: 6 MG/DL (ref 6–23)
CALCIUM SERPL-MCNC: 9.2 MG/DL (ref 8.3–10.6)
CHLORIDE BLD-SCNC: 105 MMOL/L (ref 99–110)
CHOLESTEROL: 173 MG/DL
CO2: 22 MMOL/L (ref 21–32)
CREAT SERPL-MCNC: 0.9 MG/DL (ref 0.6–1.1)
DIFFERENTIAL TYPE: ABNORMAL
EOSINOPHILS ABSOLUTE: 0.4 K/CU MM
EOSINOPHILS RELATIVE PERCENT: 4 % (ref 0–3)
GFR AFRICAN AMERICAN: >60 ML/MIN/1.73M2
GFR NON-AFRICAN AMERICAN: >60 ML/MIN/1.73M2
GLUCOSE BLD-MCNC: 88 MG/DL (ref 70–99)
HCT VFR BLD CALC: 43.7 % (ref 37–47)
HDLC SERPL-MCNC: 35 MG/DL
HEMOGLOBIN: 14.1 GM/DL (ref 12.5–16)
HEPATITIS C ANTIBODY: NON REACTIVE
IMMATURE NEUTROPHIL %: 0.4 % (ref 0–0.43)
LDL CHOLESTEROL CALCULATED: 87 MG/DL
LYMPHOCYTES ABSOLUTE: 3 K/CU MM
LYMPHOCYTES RELATIVE PERCENT: 30.6 % (ref 24–44)
MCH RBC QN AUTO: 30.2 PG (ref 27–31)
MCHC RBC AUTO-ENTMCNC: 32.3 % (ref 32–36)
MCV RBC AUTO: 93.6 FL (ref 78–100)
MONOCYTES ABSOLUTE: 0.9 K/CU MM
MONOCYTES RELATIVE PERCENT: 9.1 % (ref 0–4)
PDW BLD-RTO: 13.4 % (ref 11.7–14.9)
PLATELET # BLD: 336 K/CU MM (ref 140–440)
PMV BLD AUTO: 10 FL (ref 7.5–11.1)
POTASSIUM SERPL-SCNC: 4 MMOL/L (ref 3.5–5.1)
RBC # BLD: 4.67 M/CU MM (ref 4.2–5.4)
SEGMENTED NEUTROPHILS ABSOLUTE COUNT: 5.4 K/CU MM
SEGMENTED NEUTROPHILS RELATIVE PERCENT: 55.2 % (ref 36–66)
SODIUM BLD-SCNC: 139 MMOL/L (ref 135–145)
T4 FREE: 0.23 NG/DL (ref 0.9–1.8)
TOTAL IMMATURE NEUTOROPHIL: 0.04 K/CU MM
TOTAL PROTEIN: 7.1 GM/DL (ref 6.4–8.2)
TRIGL SERPL-MCNC: 257 MG/DL
TSH HIGH SENSITIVITY: 28.53 UIU/ML (ref 0.27–4.2)
WBC # BLD: 9.7 K/CU MM (ref 4–10.5)

## 2022-06-20 PROCEDURE — 86376 MICROSOMAL ANTIBODY EACH: CPT

## 2022-06-20 PROCEDURE — 36415 COLL VENOUS BLD VENIPUNCTURE: CPT

## 2022-06-20 PROCEDURE — 80061 LIPID PANEL: CPT

## 2022-06-20 PROCEDURE — 84443 ASSAY THYROID STIM HORMONE: CPT

## 2022-06-20 PROCEDURE — 86800 THYROGLOBULIN ANTIBODY: CPT

## 2022-06-20 PROCEDURE — 84439 ASSAY OF FREE THYROXINE: CPT

## 2022-06-20 PROCEDURE — 86803 HEPATITIS C AB TEST: CPT

## 2022-06-20 PROCEDURE — 80053 COMPREHEN METABOLIC PANEL: CPT

## 2022-06-20 PROCEDURE — 85025 COMPLETE CBC W/AUTO DIFF WBC: CPT

## 2022-06-21 LAB — ANTITHYROID MICORSOMAL: 295.7 IU/ML (ref 0–9)

## 2022-06-23 RX ORDER — LEVOTHYROXINE SODIUM 0.2 MG/1
200 TABLET ORAL DAILY
Qty: 30 TABLET | Refills: 1 | Status: SHIPPED | OUTPATIENT
Start: 2022-06-23 | End: 2022-07-21

## 2022-07-21 RX ORDER — LEVOTHYROXINE SODIUM 0.2 MG/1
TABLET ORAL
Qty: 30 TABLET | Refills: 1 | Status: SHIPPED | OUTPATIENT
Start: 2022-07-21 | End: 2022-08-22

## 2022-08-11 PROBLEM — E04.2 MULTINODULAR GOITER: Status: ACTIVE | Noted: 2022-08-11

## 2022-08-11 PROBLEM — E66.813 CLASS 3 SEVERE OBESITY WITH BODY MASS INDEX (BMI) OF 40.0 TO 44.9 IN ADULT: Status: ACTIVE | Noted: 2022-08-11

## 2022-08-11 PROBLEM — E66.01 CLASS 3 SEVERE OBESITY WITH BODY MASS INDEX (BMI) OF 40.0 TO 44.9 IN ADULT (HCC): Status: ACTIVE | Noted: 2022-08-11

## 2022-08-22 RX ORDER — LEVOTHYROXINE SODIUM 0.2 MG/1
TABLET ORAL
Qty: 30 TABLET | Refills: 11 | Status: SHIPPED | OUTPATIENT
Start: 2022-08-22

## 2022-11-17 ENCOUNTER — TELEPHONE (OUTPATIENT)
Dept: FAMILY MEDICINE CLINIC | Age: 42
End: 2022-11-17

## 2022-11-17 RX ORDER — LEVOTHYROXINE SODIUM 0.2 MG/1
TABLET ORAL
Qty: 30 TABLET | Refills: 0 | Status: SHIPPED | OUTPATIENT
Start: 2022-11-17

## 2022-11-17 NOTE — TELEPHONE ENCOUNTER
----- Message from Jordan Han sent at 11/14/2022  4:27 PM EST -----  Subject: Message to Provider    QUESTIONS  Information for Provider? Patient is wanting to schedule for her pap only. She is also asking if their are any sooner appt then the one she already   has for a medication follow up can she be called. ---------------------------------------------------------------------------  --------------  Myra GUAMAN  [phone]; OK to leave message on voicemail  ---------------------------------------------------------------------------  --------------  SCRIPT ANSWERS  Relationship to Patient?  Self

## 2022-11-17 NOTE — TELEPHONE ENCOUNTER
Patient already scheduled for 12/8/2022, no earlier availability, will just add PAP to the 12/8/2022 appointment.   Patient notified and is okay with plan

## 2022-12-08 ENCOUNTER — OFFICE VISIT (OUTPATIENT)
Dept: FAMILY MEDICINE CLINIC | Age: 42
End: 2022-12-08

## 2022-12-08 VITALS
WEIGHT: 248.8 LBS | TEMPERATURE: 97.5 F | HEART RATE: 76 BPM | BODY MASS INDEX: 40.16 KG/M2 | SYSTOLIC BLOOD PRESSURE: 114 MMHG | RESPIRATION RATE: 16 BRPM | DIASTOLIC BLOOD PRESSURE: 78 MMHG | OXYGEN SATURATION: 98 %

## 2022-12-08 DIAGNOSIS — Z12.4 ENCOUNTER FOR PAPANICOLAOU SMEAR FOR CERVICAL CANCER SCREENING: Primary | ICD-10-CM

## 2022-12-08 DIAGNOSIS — M25.50 MULTIPLE JOINT PAIN: ICD-10-CM

## 2022-12-08 DIAGNOSIS — E03.9 ACQUIRED HYPOTHYROIDISM: ICD-10-CM

## 2022-12-08 DIAGNOSIS — Z12.31 ENCOUNTER FOR SCREENING MAMMOGRAM FOR MALIGNANT NEOPLASM OF BREAST: ICD-10-CM

## 2022-12-08 PROBLEM — Z01.419 WELL WOMAN EXAM WITH ROUTINE GYNECOLOGICAL EXAM: Status: ACTIVE | Noted: 2022-02-22

## 2022-12-08 LAB
A/G RATIO: 1.4 (ref 1.1–2.2)
ALBUMIN SERPL-MCNC: 4.2 G/DL (ref 3.4–5)
ALP BLD-CCNC: 69 U/L (ref 40–129)
ALT SERPL-CCNC: 17 U/L (ref 10–40)
ANION GAP SERPL CALCULATED.3IONS-SCNC: 11 MMOL/L (ref 3–16)
AST SERPL-CCNC: 15 U/L (ref 15–37)
BASOPHILS ABSOLUTE: 0.1 K/UL (ref 0–0.2)
BASOPHILS RELATIVE PERCENT: 0.7 %
BILIRUB SERPL-MCNC: 0.4 MG/DL (ref 0–1)
BUN BLDV-MCNC: 8 MG/DL (ref 7–20)
C-REACTIVE PROTEIN: <3 MG/L (ref 0–5.1)
CALCIUM SERPL-MCNC: 9.4 MG/DL (ref 8.3–10.6)
CHLORIDE BLD-SCNC: 102 MMOL/L (ref 99–110)
CHOLESTEROL, FASTING: 158 MG/DL (ref 0–199)
CO2: 24 MMOL/L (ref 21–32)
CREAT SERPL-MCNC: 0.7 MG/DL (ref 0.6–1.1)
EOSINOPHILS ABSOLUTE: 0.2 K/UL (ref 0–0.6)
EOSINOPHILS RELATIVE PERCENT: 2.8 %
GFR SERPL CREATININE-BSD FRML MDRD: >60 ML/MIN/{1.73_M2}
GLUCOSE FASTING: 88 MG/DL (ref 70–99)
HCT VFR BLD CALC: 41.5 % (ref 36–48)
HDLC SERPL-MCNC: 34 MG/DL (ref 40–60)
HEMOGLOBIN: 13.4 G/DL (ref 12–16)
LDL CHOLESTEROL CALCULATED: 88 MG/DL
LYMPHOCYTES ABSOLUTE: 1.9 K/UL (ref 1–5.1)
LYMPHOCYTES RELATIVE PERCENT: 24.4 %
MCH RBC QN AUTO: 30.5 PG (ref 26–34)
MCHC RBC AUTO-ENTMCNC: 32.3 G/DL (ref 31–36)
MCV RBC AUTO: 94.4 FL (ref 80–100)
MONOCYTES ABSOLUTE: 0.7 K/UL (ref 0–1.3)
MONOCYTES RELATIVE PERCENT: 9.2 %
NEUTROPHILS ABSOLUTE: 4.8 K/UL (ref 1.7–7.7)
NEUTROPHILS RELATIVE PERCENT: 62.9 %
PDW BLD-RTO: 15.2 % (ref 12.4–15.4)
PLATELET # BLD: 322 K/UL (ref 135–450)
PMV BLD AUTO: 9.2 FL (ref 5–10.5)
POTASSIUM SERPL-SCNC: 4.4 MMOL/L (ref 3.5–5.1)
RBC # BLD: 4.4 M/UL (ref 4–5.2)
RHEUMATOID FACTOR: >650 IU/ML
SEDIMENTATION RATE, ERYTHROCYTE: 41 MM/HR (ref 0–20)
SODIUM BLD-SCNC: 137 MMOL/L (ref 136–145)
T4 FREE: 1 NG/DL (ref 0.9–1.8)
THYROID PEROXIDASE (TPO) ABS: 287 IU/ML
TOTAL PROTEIN: 7.2 G/DL (ref 6.4–8.2)
TRIGLYCERIDE, FASTING: 180 MG/DL (ref 0–150)
TSH SERPL DL<=0.05 MIU/L-ACNC: 14.46 UIU/ML (ref 0.27–4.2)
VITAMIN D 25-HYDROXY: 14.2 NG/ML
VLDLC SERPL CALC-MCNC: 36 MG/DL
WBC # BLD: 7.7 K/UL (ref 4–11)
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
## 2022-12-09 DIAGNOSIS — E55.9 VITAMIN D DEFICIENCY: ICD-10-CM

## 2022-12-09 DIAGNOSIS — M25.50 MULTIPLE JOINT PAIN: Primary | ICD-10-CM

## 2022-12-09 DIAGNOSIS — E03.9 ACQUIRED HYPOTHYROIDISM: ICD-10-CM

## 2022-12-09 DIAGNOSIS — R76.8 ELEVATED RHEUMATOID FACTOR: ICD-10-CM

## 2022-12-09 LAB
ANTI-NUCLEAR ANTIBODY (ANA): NEGATIVE
CANDIDA SPECIES, DNA PROBE: NORMAL
GARDNERELLA VAGINALIS, DNA PROBE: NORMAL
HPV COMMENT: NORMAL
HPV TYPE 16: NOT DETECTED
HPV TYPE 18: NOT DETECTED
HPVOH (OTHER TYPES): NOT DETECTED
TRICHOMONAS VAGINALIS DNA: NORMAL

## 2022-12-09 RX ORDER — ACETAMINOPHEN 160 MG
1 TABLET,DISINTEGRATING ORAL
Qty: 30 CAPSULE | Refills: 0 | Status: SHIPPED | OUTPATIENT
Start: 2022-12-09 | End: 2023-01-08

## 2022-12-09 RX ORDER — LEVOTHYROXINE SODIUM 300 UG/1
300 TABLET ORAL DAILY
Qty: 30 TABLET | Refills: 3 | Status: SHIPPED | OUTPATIENT
Start: 2022-12-09

## 2022-12-10 LAB
C TRACH DNA GENITAL QL NAA+PROBE: NEGATIVE
N. GONORRHOEAE DNA: NEGATIVE

## 2022-12-11 LAB — THYROID STIMULATING IMMUNOGLOBULIN: 0.1 IU/L

## 2022-12-11 ASSESSMENT — ENCOUNTER SYMPTOMS
WHEEZING: 0
SHORTNESS OF BREATH: 0

## 2022-12-12 ENCOUNTER — TELEPHONE (OUTPATIENT)
Dept: FAMILY MEDICINE CLINIC | Age: 42
End: 2022-12-12

## 2022-12-12 NOTE — TELEPHONE ENCOUNTER
Referral to [facility] PROVID[facility] Counts include 234 beds at the [facility] endocrinology faxed with clinicals, confirmation received.

## 2023-01-03 DIAGNOSIS — E03.9 ACQUIRED HYPOTHYROIDISM: ICD-10-CM

## 2023-01-03 DIAGNOSIS — E55.9 VITAMIN D DEFICIENCY: ICD-10-CM

## 2023-01-03 RX ORDER — ACETAMINOPHEN 160 MG
1 TABLET,DISINTEGRATING ORAL
Qty: 90 CAPSULE | Refills: 2 | Status: SHIPPED | OUTPATIENT
Start: 2023-01-03 | End: 2023-04-03

## 2023-01-03 RX ORDER — LEVOTHYROXINE SODIUM 300 UG/1
300 TABLET ORAL DAILY
Qty: 90 TABLET | Refills: 2 | Status: SHIPPED | OUTPATIENT
Start: 2023-01-03 | End: 2023-04-03

## 2023-01-07 PROBLEM — Z01.419 WELL WOMAN EXAM WITH ROUTINE GYNECOLOGICAL EXAM: Status: RESOLVED | Noted: 2022-02-22 | Resolved: 2023-01-07
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
## 2023-01-20 ENCOUNTER — OFFICE VISIT (OUTPATIENT)
Dept: RHEUMATOLOGY | Age: 43
End: 2023-01-20
Payer: COMMERCIAL

## 2023-01-20 VITALS
SYSTOLIC BLOOD PRESSURE: 110 MMHG | WEIGHT: 242 LBS | HEART RATE: 79 BPM | DIASTOLIC BLOOD PRESSURE: 80 MMHG | OXYGEN SATURATION: 97 % | BODY MASS INDEX: 39.06 KG/M2

## 2023-01-20 DIAGNOSIS — Z79.899 HIGH RISK MEDICATION USE: ICD-10-CM

## 2023-01-20 DIAGNOSIS — J84.10 GRANULOMATOUS LUNG DISEASE (HCC): ICD-10-CM

## 2023-01-20 DIAGNOSIS — M05.79 RHEUMATOID ARTHRITIS INVOLVING MULTIPLE SITES WITH POSITIVE RHEUMATOID FACTOR (HCC): Primary | ICD-10-CM

## 2023-01-20 DIAGNOSIS — E55.9 VITAMIN D DEFICIENCY: ICD-10-CM

## 2023-01-20 DIAGNOSIS — Z01.89 ENCOUNTER FOR OTHER SPECIFIED SPECIAL EXAMINATIONS: ICD-10-CM

## 2023-01-20 PROCEDURE — 99204 OFFICE O/P NEW MOD 45 MIN: CPT | Performed by: STUDENT IN AN ORGANIZED HEALTH CARE EDUCATION/TRAINING PROGRAM

## 2023-01-20 RX ORDER — FOLIC ACID 1 MG/1
1 TABLET ORAL DAILY
Qty: 90 TABLET | Refills: 1 | Status: SHIPPED | OUTPATIENT
Start: 2023-01-20

## 2023-01-20 NOTE — PATIENT INSTRUCTIONS
Complete ordered labs and get imaging done  Start MTX 5 tabs weekly   Start folic acid 1 mg daily   We will discuss results at next visit  RTC in 4 weeks

## 2023-01-27 ENCOUNTER — HOSPITAL ENCOUNTER (OUTPATIENT)
Dept: CT IMAGING | Age: 43
Discharge: HOME | End: 2023-01-27
Payer: COMMERCIAL

## 2023-01-27 ENCOUNTER — HOSPITAL ENCOUNTER (OUTPATIENT)
Dept: GENERAL RADIOLOGY | Age: 43
Discharge: HOME | End: 2023-01-27
Payer: COMMERCIAL

## 2023-01-27 ENCOUNTER — HOSPITAL ENCOUNTER (OUTPATIENT)
Age: 43
Discharge: HOME | End: 2023-01-27
Payer: COMMERCIAL

## 2023-01-27 DIAGNOSIS — M05.79 RHEUMATOID ARTHRITIS INVOLVING MULTIPLE SITES WITH POSITIVE RHEUMATOID FACTOR (HCC): ICD-10-CM

## 2023-01-27 DIAGNOSIS — J84.10 GRANULOMATOUS LUNG DISEASE (HCC): ICD-10-CM

## 2023-01-27 LAB
ALBUMIN SERPL-MCNC: 4 GM/DL (ref 3.4–5)
ALBUMIN SERPL-MCNC: 4 GM/DL (ref 3.4–5)
ALP BLD-CCNC: 78 IU/L (ref 40–129)
ALT SERPL-CCNC: 29 U/L (ref 10–40)
ANION GAP SERPL CALCULATED.3IONS-SCNC: 8 MMOL/L (ref 4–16)
AST SERPL-CCNC: 20 IU/L (ref 15–37)
BASOPHILS ABSOLUTE: 0.1 K/CU MM
BASOPHILS RELATIVE PERCENT: 0.6 % (ref 0–1)
BILIRUB SERPL-MCNC: 0.3 MG/DL (ref 0–1)
BILIRUBIN DIRECT: 0.2 MG/DL (ref 0–0.3)
BILIRUBIN, INDIRECT: 0.1 MG/DL (ref 0–0.7)
BUN BLDV-MCNC: 10 MG/DL (ref 6–23)
C-REACTIVE PROTEIN, HIGH SENSITIVITY: 3 MG/L
CALCIUM SERPL-MCNC: 9.5 MG/DL (ref 8.3–10.6)
CHLORIDE BLD-SCNC: 107 MMOL/L (ref 99–110)
CO2: 28 MMOL/L (ref 21–32)
CREAT SERPL-MCNC: 0.7 MG/DL (ref 0.6–1.1)
DIFFERENTIAL TYPE: ABNORMAL
EOSINOPHILS ABSOLUTE: 0.3 K/CU MM
EOSINOPHILS RELATIVE PERCENT: 3.3 % (ref 0–3)
ERYTHROCYTE SEDIMENTATION RATE: 8 MM/HR (ref 0–20)
GFR SERPL CREATININE-BSD FRML MDRD: >60 ML/MIN/1.73M2
GLUCOSE BLD-MCNC: 93 MG/DL (ref 70–99)
HAV IGM SER IA-ACNC: NON REACTIVE
HCT VFR BLD CALC: 40.4 % (ref 37–47)
HEMOGLOBIN: 12.7 GM/DL (ref 12.5–16)
HEPATITIS B CORE IGM ANTIBODY: NON REACTIVE
HEPATITIS B SURFACE ANTIGEN: NON REACTIVE
HEPATITIS C ANTIBODY: NON REACTIVE
IMMATURE NEUTROPHIL %: 0.2 % (ref 0–0.43)
LYMPHOCYTES ABSOLUTE: 2.1 K/CU MM
LYMPHOCYTES RELATIVE PERCENT: 23.1 % (ref 24–44)
MCH RBC QN AUTO: 29.1 PG (ref 27–31)
MCHC RBC AUTO-ENTMCNC: 31.4 % (ref 32–36)
MCV RBC AUTO: 92.7 FL (ref 78–100)
MONOCYTES ABSOLUTE: 0.9 K/CU MM
MONOCYTES RELATIVE PERCENT: 9.5 % (ref 0–4)
PDW BLD-RTO: 12.9 % (ref 11.7–14.9)
PHOSPHORUS: 3.9 MG/DL (ref 2.5–4.9)
PLATELET # BLD: 385 K/CU MM (ref 140–440)
PMV BLD AUTO: 10.1 FL (ref 7.5–11.1)
POTASSIUM SERPL-SCNC: 4.6 MMOL/L (ref 3.5–5.1)
RBC # BLD: 4.36 M/CU MM (ref 4.2–5.4)
SEGMENTED NEUTROPHILS ABSOLUTE COUNT: 5.7 K/CU MM
SEGMENTED NEUTROPHILS RELATIVE PERCENT: 63.3 % (ref 36–66)
SODIUM BLD-SCNC: 143 MMOL/L (ref 135–145)
TOTAL IMMATURE NEUTOROPHIL: 0.02 K/CU MM
TOTAL PROTEIN: 6.6 GM/DL (ref 6.4–8.2)
VITAMIN D 25-HYDROXY: 9.69 NG/ML
WBC # BLD: 9 K/CU MM (ref 4–10.5)

## 2023-01-27 PROCEDURE — 84100 ASSAY OF PHOSPHORUS: CPT

## 2023-01-27 PROCEDURE — 86140 C-REACTIVE PROTEIN: CPT

## 2023-01-27 PROCEDURE — 86200 CCP ANTIBODY: CPT

## 2023-01-27 PROCEDURE — 80053 COMPREHEN METABOLIC PANEL: CPT

## 2023-01-27 PROCEDURE — 82248 BILIRUBIN DIRECT: CPT

## 2023-01-27 PROCEDURE — 85652 RBC SED RATE AUTOMATED: CPT

## 2023-01-27 PROCEDURE — 73130 X-RAY EXAM OF HAND: CPT

## 2023-01-27 PROCEDURE — 36415 COLL VENOUS BLD VENIPUNCTURE: CPT

## 2023-01-27 PROCEDURE — 85025 COMPLETE CBC W/AUTO DIFF WBC: CPT

## 2023-01-27 PROCEDURE — 82306 VITAMIN D 25 HYDROXY: CPT

## 2023-01-27 PROCEDURE — 71250 CT THORAX DX C-: CPT

## 2023-01-27 PROCEDURE — 86480 TB TEST CELL IMMUN MEASURE: CPT

## 2023-01-27 PROCEDURE — 73630 X-RAY EXAM OF FOOT: CPT

## 2023-01-27 PROCEDURE — 80074 ACUTE HEPATITIS PANEL: CPT

## 2023-01-29 LAB — CYCLIC CITRULLINATED PEPTIDE ANTIBODY IGG: 238 UNITS (ref 0–19)

## 2023-02-20 ENCOUNTER — TELEPHONE (OUTPATIENT)
Dept: RHEUMATOLOGY | Age: 43
End: 2023-02-20

## 2023-02-20 NOTE — TELEPHONE ENCOUNTER
Pt called requesting oral steroid for her OA. She states that her symptoms have worsened and her pain level is 7/10. Please advise.   [facility] [address]

## 2023-02-21 ENCOUNTER — TELEPHONE (OUTPATIENT)
Dept: RHEUMATOLOGY | Age: 43
End: 2023-02-21

## 2023-02-21 NOTE — TELEPHONE ENCOUNTER
----- Message from Rebeka Sykes MD sent at 2/21/2023  3:22 AM EST -----  Notify patient that MTX labs have been ordered, she can go to the lab for blood draw any day as from 3/13 before her next appt on 3/30

## 2023-02-21 NOTE — TELEPHONE ENCOUNTER
LVM for the pt advising her that she will need to have Methotrexate labs completed after 3/13 but before her appt on 3/30.

## 2023-02-23 ENCOUNTER — OFFICE VISIT (OUTPATIENT)
Dept: RHEUMATOLOGY | Age: 43
End: 2023-02-23
Payer: COMMERCIAL

## 2023-02-23 VITALS
HEART RATE: 81 BPM | WEIGHT: 238 LBS | SYSTOLIC BLOOD PRESSURE: 100 MMHG | DIASTOLIC BLOOD PRESSURE: 75 MMHG | BODY MASS INDEX: 38.41 KG/M2 | OXYGEN SATURATION: 97 %

## 2023-02-23 DIAGNOSIS — Z79.899 HIGH RISK MEDICATION USE: ICD-10-CM

## 2023-02-23 DIAGNOSIS — M05.79 RHEUMATOID ARTHRITIS INVOLVING MULTIPLE SITES WITH POSITIVE RHEUMATOID FACTOR (HCC): Primary | ICD-10-CM

## 2023-02-23 DIAGNOSIS — J84.10 GRANULOMATOUS LUNG DISEASE (HCC): ICD-10-CM

## 2023-02-23 DIAGNOSIS — E55.9 VITAMIN D DEFICIENCY: ICD-10-CM

## 2023-02-23 DIAGNOSIS — M05.79 RHEUMATOID ARTHRITIS INVOLVING MULTIPLE SITES WITH POSITIVE RHEUMATOID FACTOR (HCC): ICD-10-CM

## 2023-02-23 PROCEDURE — 99214 OFFICE O/P EST MOD 30 MIN: CPT | Performed by: STUDENT IN AN ORGANIZED HEALTH CARE EDUCATION/TRAINING PROGRAM

## 2023-02-23 RX ORDER — METHOTREXATE 2.5 MG/1
TABLET ORAL
Qty: 20 TABLET | Refills: 1 | OUTPATIENT
Start: 2023-02-23

## 2023-02-23 NOTE — PATIENT INSTRUCTIONS
Get MTX labs today   Increase methotrexate to 6 tabs weekly  Continue folic acid 1 mg daily   Okay to use prednisone 10mg daily for flares   RTC in 4 weeks
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
## 2023-02-24 ENCOUNTER — HOSPITAL ENCOUNTER (OUTPATIENT)
Age: 43
Discharge: HOME | End: 2023-02-24
Payer: COMMERCIAL

## 2023-02-24 LAB
ALBUMIN SERPL-MCNC: 4.1 GM/DL (ref 3.4–5)
ALP BLD-CCNC: 83 IU/L (ref 40–129)
ALT SERPL-CCNC: 20 U/L (ref 10–40)
ANION GAP SERPL CALCULATED.3IONS-SCNC: 11 MMOL/L (ref 4–16)
AST SERPL-CCNC: 22 IU/L (ref 15–37)
BASOPHILS ABSOLUTE: 0.1 K/CU MM
BASOPHILS RELATIVE PERCENT: 0.8 % (ref 0–1)
BILIRUB SERPL-MCNC: 0.2 MG/DL (ref 0–1)
BUN SERPL-MCNC: 9 MG/DL (ref 6–23)
CALCIUM SERPL-MCNC: 10 MG/DL (ref 8.3–10.6)
CHLORIDE BLD-SCNC: 98 MMOL/L (ref 99–110)
CO2: 25 MMOL/L (ref 21–32)
CREAT SERPL-MCNC: 0.6 MG/DL (ref 0.6–1.1)
DIFFERENTIAL TYPE: ABNORMAL
EOSINOPHILS ABSOLUTE: 0.3 K/CU MM
EOSINOPHILS RELATIVE PERCENT: 3.1 % (ref 0–3)
GFR SERPL CREATININE-BSD FRML MDRD: >60 ML/MIN/1.73M2
GLUCOSE P FAST SERPL-MCNC: 97 MG/DL (ref 70–99)
HCT VFR BLD CALC: 43 % (ref 37–47)
HEMOGLOBIN: 13.4 GM/DL (ref 12.5–16)
IMMATURE NEUTROPHIL %: 0.2 % (ref 0–0.43)
LYMPHOCYTES ABSOLUTE: 2.6 K/CU MM
LYMPHOCYTES RELATIVE PERCENT: 27.9 % (ref 24–44)
MCH RBC QN AUTO: 28.6 PG (ref 27–31)
MCHC RBC AUTO-ENTMCNC: 31.2 % (ref 32–36)
MCV RBC AUTO: 91.7 FL (ref 78–100)
MONOCYTES ABSOLUTE: 1 K/CU MM
MONOCYTES RELATIVE PERCENT: 10.9 % (ref 0–4)
PDW BLD-RTO: 12.4 % (ref 11.7–14.9)
PLATELET # BLD: 398 K/CU MM (ref 140–440)
PMV BLD AUTO: 10 FL (ref 7.5–11.1)
POTASSIUM SERPL-SCNC: 4.2 MMOL/L (ref 3.5–5.1)
RBC # BLD: 4.69 M/CU MM (ref 4.2–5.4)
SEGMENTED NEUTROPHILS ABSOLUTE COUNT: 5.4 K/CU MM
SEGMENTED NEUTROPHILS RELATIVE PERCENT: 57.1 % (ref 36–66)
SODIUM BLD-SCNC: 134 MMOL/L (ref 135–145)
TOTAL IMMATURE NEUTOROPHIL: 0.02 K/CU MM
TOTAL PROTEIN: 7.5 GM/DL (ref 6.4–8.2)
WBC # BLD: 9.4 K/CU MM (ref 4–10.5)

## 2023-02-24 PROCEDURE — 85025 COMPLETE CBC W/AUTO DIFF WBC: CPT

## 2023-02-24 PROCEDURE — 36415 COLL VENOUS BLD VENIPUNCTURE: CPT

## 2023-02-24 PROCEDURE — 80053 COMPREHEN METABOLIC PANEL: CPT

## 2023-03-21 DIAGNOSIS — M05.79 RHEUMATOID ARTHRITIS INVOLVING MULTIPLE SITES WITH POSITIVE RHEUMATOID FACTOR (HCC): ICD-10-CM

## 2023-03-21 RX ORDER — METHOTREXATE 2.5 MG/1
15 TABLET ORAL
Qty: 24 TABLET | Refills: 0 | Status: SHIPPED | OUTPATIENT
Start: 2023-03-21

## 2023-03-22 DIAGNOSIS — M05.79 RHEUMATOID ARTHRITIS INVOLVING MULTIPLE SITES WITH POSITIVE RHEUMATOID FACTOR (HCC): ICD-10-CM

## 2023-03-23 RX ORDER — METHOTREXATE 2.5 MG/1
TABLET ORAL
Qty: 25 TABLET | OUTPATIENT
Start: 2023-03-23

## 2023-05-16 DIAGNOSIS — E55.9 VITAMIN D DEFICIENCY: ICD-10-CM

## 2023-05-16 RX ORDER — MELATONIN
2000 DAILY
Qty: 180 TABLET | Refills: 1 | Status: SHIPPED | OUTPATIENT
Start: 2023-05-16

## 2023-06-01 LAB
T4 FREE: 1.38
TSH SERPL DL<=0.05 MIU/L-ACNC: 0.01 UIU/ML

## 2023-06-08 ENCOUNTER — OFFICE VISIT (OUTPATIENT)
Dept: FAMILY MEDICINE CLINIC | Age: 43
End: 2023-06-08
Payer: COMMERCIAL

## 2023-06-08 VITALS
TEMPERATURE: 97.4 F | DIASTOLIC BLOOD PRESSURE: 63 MMHG | WEIGHT: 232.6 LBS | BODY MASS INDEX: 37.38 KG/M2 | HEART RATE: 69 BPM | OXYGEN SATURATION: 96 % | HEIGHT: 66 IN | RESPIRATION RATE: 16 BRPM | SYSTOLIC BLOOD PRESSURE: 105 MMHG

## 2023-06-08 DIAGNOSIS — E03.9 ACQUIRED HYPOTHYROIDISM: Primary | ICD-10-CM

## 2023-06-08 DIAGNOSIS — K59.09 OTHER CONSTIPATION: ICD-10-CM

## 2023-06-08 DIAGNOSIS — M05.79 RHEUMATOID ARTHRITIS INVOLVING MULTIPLE SITES WITH POSITIVE RHEUMATOID FACTOR (HCC): ICD-10-CM

## 2023-06-08 PROCEDURE — 99214 OFFICE O/P EST MOD 30 MIN: CPT | Performed by: NURSE PRACTITIONER

## 2023-06-08 RX ORDER — LEVOTHYROXINE SODIUM 137 UG/1
274 TABLET ORAL DAILY
COMMUNITY
Start: 2023-06-01

## 2023-06-08 SDOH — ECONOMIC STABILITY: INCOME INSECURITY: HOW HARD IS IT FOR YOU TO PAY FOR THE VERY BASICS LIKE FOOD, HOUSING, MEDICAL CARE, AND HEATING?: NOT HARD AT ALL

## 2023-06-08 SDOH — ECONOMIC STABILITY: FOOD INSECURITY: WITHIN THE PAST 12 MONTHS, YOU WORRIED THAT YOUR FOOD WOULD RUN OUT BEFORE YOU GOT MONEY TO BUY MORE.: NEVER TRUE

## 2023-06-08 SDOH — ECONOMIC STABILITY: FOOD INSECURITY: WITHIN THE PAST 12 MONTHS, THE FOOD YOU BOUGHT JUST DIDN'T LAST AND YOU DIDN'T HAVE MONEY TO GET MORE.: NEVER TRUE

## 2023-06-08 SDOH — ECONOMIC STABILITY: HOUSING INSECURITY
IN THE LAST 12 MONTHS, WAS THERE A TIME WHEN YOU DID NOT HAVE A STEADY PLACE TO SLEEP OR SLEPT IN A SHELTER (INCLUDING NOW)?: NO

## 2023-06-08 ASSESSMENT — PATIENT HEALTH QUESTIONNAIRE - PHQ9
SUM OF ALL RESPONSES TO PHQ QUESTIONS 1-9: 0
SUM OF ALL RESPONSES TO PHQ9 QUESTIONS 1 & 2: 0
2. FEELING DOWN, DEPRESSED OR HOPELESS: 0
SUM OF ALL RESPONSES TO PHQ QUESTIONS 1-9: 0
1. LITTLE INTEREST OR PLEASURE IN DOING THINGS: 0
SUM OF ALL RESPONSES TO PHQ QUESTIONS 1-9: 0
SUM OF ALL RESPONSES TO PHQ QUESTIONS 1-9: 0

## 2023-06-08 ASSESSMENT — ENCOUNTER SYMPTOMS
CONSTIPATION: 1
RESPIRATORY NEGATIVE: 1
EYES NEGATIVE: 1
DIARRHEA: 0
NAUSEA: 0
ABDOMINAL PAIN: 0
VOMITING: 0

## 2023-06-08 NOTE — PROGRESS NOTES
as prescribed. Follow up endocrinology as scheduled. 2. Other constipation  Increase your daily fiber intake; eating foods high in fiber like fresh fruits with the peel & fresh vegetables, and whole grain foods. Limit cheese as it can cause constipation. Make sure that you are drinking at least 8 glasses of water every day. May take a daily fiber supplement like Metamucil or Benefiber. 3. Rheumatoid arthritis involving multiple sites with positive rheumatoid factor ([facility]  Follow up with rheumatology as prescribed. The Reymundo Ge,  was seen with a total time spent of 30 minutes for the visit on this date of service by the E/M provider. The time component had both face to face and non face to face time spent in determining the total time component. Counseling and education regarding diagnosis listed and options regarding those diagnoses were also included in determining the time component.                WINNIE Sandhu NP

## 2023-06-13 DIAGNOSIS — M05.79 RHEUMATOID ARTHRITIS INVOLVING MULTIPLE SITES WITH POSITIVE RHEUMATOID FACTOR (HCC): ICD-10-CM

## 2023-06-20 RX ORDER — PREDNISONE 5 MG/1
10 TABLET ORAL DAILY
Qty: 60 TABLET | Refills: 0 | OUTPATIENT
Start: 2023-06-20
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
## 2023-12-08 ENCOUNTER — TELEPHONE (OUTPATIENT)
Dept: RHEUMATOLOGY | Age: 43
End: 2023-12-08

## 2023-12-08 ENCOUNTER — OFFICE VISIT (OUTPATIENT)
Dept: FAMILY MEDICINE CLINIC | Age: 43
End: 2023-12-08
Payer: COMMERCIAL

## 2023-12-08 VITALS
WEIGHT: 242 LBS | BODY MASS INDEX: 38.89 KG/M2 | HEART RATE: 77 BPM | DIASTOLIC BLOOD PRESSURE: 81 MMHG | SYSTOLIC BLOOD PRESSURE: 110 MMHG | RESPIRATION RATE: 16 BRPM | HEIGHT: 66 IN | OXYGEN SATURATION: 96 %

## 2023-12-08 DIAGNOSIS — M05.79 RHEUMATOID ARTHRITIS INVOLVING MULTIPLE SITES WITH POSITIVE RHEUMATOID FACTOR (HCC): ICD-10-CM

## 2023-12-08 DIAGNOSIS — Z13.220 SCREENING FOR CHOLESTEROL LEVEL: ICD-10-CM

## 2023-12-08 DIAGNOSIS — E55.9 VITAMIN D DEFICIENCY: ICD-10-CM

## 2023-12-08 DIAGNOSIS — Z13.1 SCREENING FOR DIABETES MELLITUS: ICD-10-CM

## 2023-12-08 DIAGNOSIS — Z00.01 ENCOUNTER FOR HEALTH MAINTENANCE EXAMINATION WITH ABNORMAL FINDINGS: Primary | ICD-10-CM

## 2023-12-08 DIAGNOSIS — Z13.0 SCREENING FOR DEFICIENCY ANEMIA: ICD-10-CM

## 2023-12-08 PROCEDURE — 99396 PREV VISIT EST AGE 40-64: CPT | Performed by: NURSE PRACTITIONER

## 2023-12-08 NOTE — TELEPHONE ENCOUNTER
Dr. Christin Castillo called into the clinic today requesting any recommendations on medications for this pt until the pt is able to get into to see Dr. Emi Bernardo. I stated that any recommendations would need to come from Dr. Emi Bernardo, and I was more than happy to get the providers advice on this. The pt is currently in an active flare and is in need of medications as soon as possible. I assured Dr. Christin Castillo that I would check our schedule for any cancellations in attempts to get the pt in before next year. I will call the pt to schedule them today, but in the meantime do you have any recommendations for the provider?  Please advise

## 2023-12-08 NOTE — TELEPHONE ENCOUNTER
I called the pt today to reschedule her last appointment. We had a cancellation slot come available for Tuesday 12/12/23 @8:00am. The pt declined and stated that Dr. Giancarlo Paulson is going to refer her to a different rheumatologist. I made the pt aware that if she changed her mind in the future, she is more than welcome to reach back out and get re-established with the provider.  The pt was very appreciative

## 2023-12-11 DIAGNOSIS — M05.79 RHEUMATOID ARTHRITIS INVOLVING MULTIPLE SITES WITH POSITIVE RHEUMATOID FACTOR (HCC): Primary | ICD-10-CM

## 2023-12-11 PROBLEM — E66.813 CLASS 3 SEVERE OBESITY WITH BODY MASS INDEX (BMI) OF 40.0 TO 44.9 IN ADULT: Status: RESOLVED | Noted: 2022-08-11 | Resolved: 2023-12-11

## 2023-12-11 PROBLEM — E66.01 CLASS 3 SEVERE OBESITY WITH BODY MASS INDEX (BMI) OF 40.0 TO 44.9 IN ADULT (HCC): Status: RESOLVED | Noted: 2022-08-11 | Resolved: 2023-12-11

## 2023-12-11 RX ORDER — ASPIRIN 81 MG
TABLET,CHEWABLE ORAL
Qty: 56.6 G | Refills: 5 | Status: SHIPPED | OUTPATIENT
Start: 2023-12-11

## 2023-12-11 ASSESSMENT — ENCOUNTER SYMPTOMS
SINUS PRESSURE: 0
RHINORRHEA: 0
CHEST TIGHTNESS: 0
ABDOMINAL PAIN: 0
SINUS PAIN: 0
TROUBLE SWALLOWING: 0
VOMITING: 0
DIARRHEA: 0
NAUSEA: 0
WHEEZING: 0
CHOKING: 0
SHORTNESS OF BREATH: 0
SORE THROAT: 0
VOICE CHANGE: 0
EYES NEGATIVE: 1
GASTROINTESTINAL NEGATIVE: 1
RESPIRATORY NEGATIVE: 1

## 2024-03-08 ENCOUNTER — OFFICE VISIT (OUTPATIENT)
Dept: FAMILY MEDICINE CLINIC | Age: 44
End: 2024-03-08
Payer: COMMERCIAL

## 2024-03-08 VITALS
RESPIRATION RATE: 18 BRPM | BODY MASS INDEX: 39.92 KG/M2 | HEIGHT: 66 IN | HEART RATE: 80 BPM | SYSTOLIC BLOOD PRESSURE: 110 MMHG | DIASTOLIC BLOOD PRESSURE: 70 MMHG | OXYGEN SATURATION: 97 % | WEIGHT: 248.4 LBS

## 2024-03-08 DIAGNOSIS — E03.9 ACQUIRED HYPOTHYROIDISM: ICD-10-CM

## 2024-03-08 DIAGNOSIS — M05.79 RHEUMATOID ARTHRITIS INVOLVING MULTIPLE SITES WITH POSITIVE RHEUMATOID FACTOR (HCC): ICD-10-CM

## 2024-03-08 DIAGNOSIS — E66.1 CLASS 3 DRUG-INDUCED OBESITY WITH SERIOUS COMORBIDITY AND BODY MASS INDEX (BMI) OF 40.0 TO 44.9 IN ADULT (HCC): ICD-10-CM

## 2024-03-08 DIAGNOSIS — J84.10 GRANULOMATOUS LUNG DISEASE (HCC): ICD-10-CM

## 2024-03-08 DIAGNOSIS — Z76.89 ENCOUNTER TO ESTABLISH CARE WITH NEW DOCTOR: Primary | ICD-10-CM

## 2024-03-08 DIAGNOSIS — E55.9 VITAMIN D DEFICIENCY: ICD-10-CM

## 2024-03-08 DIAGNOSIS — Z12.11 COLON CANCER SCREENING: ICD-10-CM

## 2024-03-08 PROCEDURE — 99214 OFFICE O/P EST MOD 30 MIN: CPT | Performed by: PHYSICIAN ASSISTANT

## 2024-03-08 ASSESSMENT — PATIENT HEALTH QUESTIONNAIRE - PHQ9
SUM OF ALL RESPONSES TO PHQ QUESTIONS 1-9: 0
SUM OF ALL RESPONSES TO PHQ9 QUESTIONS 1 & 2: 0
SUM OF ALL RESPONSES TO PHQ QUESTIONS 1-9: 0
2. FEELING DOWN, DEPRESSED OR HOPELESS: 0
SUM OF ALL RESPONSES TO PHQ QUESTIONS 1-9: 0
1. LITTLE INTEREST OR PLEASURE IN DOING THINGS: 0
SUM OF ALL RESPONSES TO PHQ QUESTIONS 1-9: 0

## 2024-03-12 ENCOUNTER — TELEPHONE (OUTPATIENT)
Dept: BARIATRICS/WEIGHT MGMT | Age: 44
End: 2024-03-12

## 2024-03-14 ENCOUNTER — NURSE ONLY (OUTPATIENT)
Dept: FAMILY MEDICINE CLINIC | Age: 44
End: 2024-03-14
Payer: COMMERCIAL

## 2024-03-14 DIAGNOSIS — Z13.1 SCREENING FOR DIABETES MELLITUS: ICD-10-CM

## 2024-03-14 DIAGNOSIS — E06.3 HASHIMOTO'S DISEASE: Primary | ICD-10-CM

## 2024-03-14 PROCEDURE — 36415 COLL VENOUS BLD VENIPUNCTURE: CPT | Performed by: PHYSICIAN ASSISTANT

## 2024-03-15 ENCOUNTER — TELEPHONE (OUTPATIENT)
Dept: GASTROENTEROLOGY | Age: 44
End: 2024-03-15

## 2024-03-15 LAB
25(OH)D3 SERPL-MCNC: 9.4 NG/ML
ALBUMIN SERPL-MCNC: 4.1 G/DL (ref 3.4–5)
ALBUMIN/GLOB SERPL: 1.3 {RATIO} (ref 1.1–2.2)
ALP SERPL-CCNC: 68 U/L (ref 40–129)
ALT SERPL-CCNC: 16 U/L (ref 10–40)
ANION GAP SERPL CALCULATED.3IONS-SCNC: 14 MMOL/L (ref 3–16)
AST SERPL-CCNC: 17 U/L (ref 15–37)
BASOPHILS # BLD: 0.1 K/UL (ref 0–0.2)
BASOPHILS NFR BLD: 1.3 %
BILIRUB SERPL-MCNC: <0.2 MG/DL (ref 0–1)
BUN SERPL-MCNC: 7 MG/DL (ref 7–20)
CALCIUM SERPL-MCNC: 9.3 MG/DL (ref 8.3–10.6)
CHLORIDE SERPL-SCNC: 104 MMOL/L (ref 99–110)
CHOLEST SERPL-MCNC: 155 MG/DL (ref 0–199)
CO2 SERPL-SCNC: 21 MMOL/L (ref 21–32)
CREAT SERPL-MCNC: 0.7 MG/DL (ref 0.6–1.1)
DEPRECATED RDW RBC AUTO: 15.5 % (ref 12.4–15.4)
EOSINOPHIL # BLD: 0.4 K/UL (ref 0–0.6)
EOSINOPHIL NFR BLD: 3.9 %
GFR SERPLBLD CREATININE-BSD FMLA CKD-EPI: >60 ML/MIN/{1.73_M2}
GLUCOSE SERPL-MCNC: 85 MG/DL (ref 70–99)
HCT VFR BLD AUTO: 39.6 % (ref 36–48)
HDLC SERPL-MCNC: 38 MG/DL (ref 40–60)
HGB BLD-MCNC: 12.9 G/DL (ref 12–16)
LDL CHOLESTEROL CALCULATED: 86 MG/DL
LYMPHOCYTES # BLD: 2.3 K/UL (ref 1–5.1)
LYMPHOCYTES NFR BLD: 24 %
MCH RBC QN AUTO: 28.7 PG (ref 26–34)
MCHC RBC AUTO-ENTMCNC: 32.5 G/DL (ref 31–36)
MCV RBC AUTO: 88.3 FL (ref 80–100)
MONOCYTES # BLD: 0.7 K/UL (ref 0–1.3)
MONOCYTES NFR BLD: 7.4 %
NEUTROPHILS # BLD: 6 K/UL (ref 1.7–7.7)
NEUTROPHILS NFR BLD: 63.4 %
PLATELET # BLD AUTO: 414 K/UL (ref 135–450)
PMV BLD AUTO: 9.3 FL (ref 5–10.5)
POTASSIUM SERPL-SCNC: 4.2 MMOL/L (ref 3.5–5.1)
PROT SERPL-MCNC: 7.2 G/DL (ref 6.4–8.2)
RBC # BLD AUTO: 4.49 M/UL (ref 4–5.2)
SODIUM SERPL-SCNC: 139 MMOL/L (ref 136–145)
T4 FREE SERPL-MCNC: 0.4 NG/DL (ref 0.9–1.8)
TRIGL SERPL-MCNC: 155 MG/DL (ref 0–150)
TSH SERPL DL<=0.005 MIU/L-ACNC: 36.76 UIU/ML (ref 0.27–4.2)
VLDLC SERPL CALC-MCNC: 31 MG/DL
WBC # BLD AUTO: 9.5 K/UL (ref 4–11)

## 2024-03-15 NOTE — TELEPHONE ENCOUNTER
Called pt. In regards to a referral for a colon screening. Made appt for pt to see vivienne on 4/11/24 @4:15pm

## 2024-03-19 ENCOUNTER — OFFICE VISIT MH/BH (OUTPATIENT)
Dept: BARIATRICS/WEIGHT MGMT | Age: 44
End: 2024-03-19
Payer: COMMERCIAL

## 2024-03-19 VITALS
HEART RATE: 82 BPM | HEIGHT: 66 IN | BODY MASS INDEX: 39.77 KG/M2 | SYSTOLIC BLOOD PRESSURE: 112 MMHG | OXYGEN SATURATION: 97 % | WEIGHT: 247.5 LBS | DIASTOLIC BLOOD PRESSURE: 80 MMHG

## 2024-03-19 DIAGNOSIS — Z79.899 MEDICATION MANAGEMENT: ICD-10-CM

## 2024-03-19 DIAGNOSIS — E66.9 OBESITY (BMI 30-39.9): Primary | ICD-10-CM

## 2024-03-19 PROCEDURE — 80305 DRUG TEST PRSMV DIR OPT OBS: CPT | Performed by: NURSE PRACTITIONER

## 2024-03-19 PROCEDURE — 99204 OFFICE O/P NEW MOD 45 MIN: CPT | Performed by: NURSE PRACTITIONER

## 2024-03-19 RX ORDER — PHENTERMINE HYDROCHLORIDE 37.5 MG/1
37.5 TABLET ORAL
Qty: 30 TABLET | Refills: 0 | Status: SHIPPED | OUTPATIENT
Start: 2024-03-19 | End: 2024-04-18

## 2024-03-19 NOTE — PROGRESS NOTES
Maternal Grandfather         lung CA    Rheum Arthritis Paternal Grandmother     Heart Disease Paternal Grandfather     Cancer Paternal Grandfather         lung CA       Social History     Socioeconomic History    Marital status:      Spouse name: Not on file    Number of children: Not on file    Years of education: Not on file    Highest education level: Not on file   Occupational History    Not on file   Tobacco Use    Smoking status: Former     Current packs/day: 0.00     Average packs/day: 0.5 packs/day for 10.0 years (5.0 ttl pk-yrs)     Types: Cigarettes     Start date: 1/10/2012     Quit date: 1/10/2022     Years since quittin.1    Smokeless tobacco: Never    Tobacco comments:     started smoking age 19   Vaping Use    Vaping Use: Never used   Substance and Sexual Activity    Alcohol use: Yes     Comment: occ    Drug use: No    Sexual activity: Yes     Partners: Male   Other Topics Concern    Not on file   Social History Narrative    Not on file     Social Determinants of Health     Financial Resource Strain: Low Risk  (2023)    Overall Financial Resource Strain (CARDIA)     Difficulty of Paying Living Expenses: Not hard at all   Food Insecurity: Not on file (2023)   Transportation Needs: Unknown (2023)    PRAPARE - Transportation     Lack of Transportation (Medical): Not on file     Lack of Transportation (Non-Medical): No   Physical Activity: Not on file   Stress: Not on file   Social Connections: Not on file   Intimate Partner Violence: Not on file   Housing Stability: Unknown (2023)    Housing Stability Vital Sign     Unable to Pay for Housing in the Last Year: Not on file     Number of Places Lived in the Last Year: Not on file     Unstable Housing in the Last Year: No       Current Outpatient Medications   Medication Sig Dispense Refill    phentermine (ADIPEX-P) 37.5 MG tablet Take 1 tablet by mouth every morning (before breakfast) for 30 days. BMI 44. Max Daily Amount: 37.5

## 2024-03-21 DIAGNOSIS — E55.9 VITAMIN D DEFICIENCY: Primary | ICD-10-CM

## 2024-03-21 RX ORDER — METHOCARBAMOL 750 MG/1
50000 TABLET ORAL WEEKLY
Qty: 14 CAPSULE | Refills: 1 | Status: SHIPPED | OUTPATIENT
Start: 2024-03-21

## 2024-04-11 ENCOUNTER — OFFICE VISIT (OUTPATIENT)
Dept: GASTROENTEROLOGY | Age: 44
End: 2024-04-11

## 2024-04-11 VITALS
SYSTOLIC BLOOD PRESSURE: 120 MMHG | OXYGEN SATURATION: 98 % | DIASTOLIC BLOOD PRESSURE: 80 MMHG | WEIGHT: 239 LBS | BODY MASS INDEX: 38.41 KG/M2 | HEART RATE: 91 BPM | HEIGHT: 66 IN

## 2024-04-11 DIAGNOSIS — Z12.11 ENCOUNTER FOR SCREENING COLONOSCOPY: Primary | ICD-10-CM

## 2024-04-11 RX ORDER — POLYETHYLENE GLYCOL 3350, SODIUM SULFATE, SODIUM CHLORIDE, POTASSIUM CHLORIDE, ASCORBIC ACID, SODIUM ASCORBATE 140-9-5.2G
1 KIT ORAL ONCE
Qty: 1 EACH | Refills: 0 | Status: SHIPPED | OUTPATIENT
Start: 2024-04-11 | End: 2024-04-11

## 2024-04-11 RX ORDER — SIMETHICONE 80 MG
80 TABLET,CHEWABLE ORAL ONCE
Qty: 3 TABLET | Refills: 0 | Status: SHIPPED | OUTPATIENT
Start: 2024-04-11 | End: 2024-04-11

## 2024-04-11 RX ORDER — LEFLUNOMIDE 20 MG/1
20 TABLET ORAL DAILY
COMMUNITY
Start: 2024-03-26

## 2024-04-11 ASSESSMENT — ENCOUNTER SYMPTOMS
COLOR CHANGE: 0
ABDOMINAL PAIN: 0
COUGH: 0
SHORTNESS OF BREATH: 0
BLOOD IN STOOL: 0
CONSTIPATION: 0
NAUSEA: 0
BACK PAIN: 0
EYE PAIN: 0
PHOTOPHOBIA: 0
WHEEZING: 0
VOMITING: 0
DIARRHEA: 0

## 2024-04-11 NOTE — PROGRESS NOTES
Joanne Harris 43 y.o. female was seen by BIRDIE Lindsay on 4/11/2024     Wt Readings from Last 3 Encounters:   04/11/24 108.4 kg (239 lb)   03/19/24 112.3 kg (247 lb 8 oz)   03/08/24 112.7 kg (248 lb 6.4 oz)       HPI  Joanne Harris is a pleasant 43 y.o.  female who presents today for screening colonoscopy.  She has a past medical history of anesthesia, chest pain, cholelithiasis, Hashimoto's disease, hx of migraines, lung collapse, pulmonary nodule, RA (rheumatoid arthritis), and thyroid disease.  She follows with rheumatologist at the [facility]  She has never had a colonoscopy.  She denies changes in her bowel pattern. Her typical  bowel pattern is to every other day to a few times a day with soft brown formed stools.  Some diarrhea.  No constipation.  No blood in her stools or melena.  No excess belching or flatulence.  Her appetite is good without ealry satiety.  Her weight is down fifteen pounds intentionally trying to lose weight on Adipex.  Intermittent nausea.  No vomiting.  No abdominal pain, bloating or distention.  Intermittent heartburn. No nocturnal awakenings with acid reflux.  No dysphagia or pain with swallowing.  Her paternal grandfather had colon cancer at age 76.  Her great paternal grandfather had stomach cancer at early 70's.    ROS  Review of Systems   Constitutional:  Negative for appetite change, chills, diaphoresis, fever and unexpected weight change.   HENT:  Negative for ear pain, hearing loss and tinnitus.    Eyes:  Negative for photophobia, pain and visual disturbance.   Respiratory:  Negative for cough, shortness of breath and wheezing.    Cardiovascular:  Positive for leg swelling. Negative for chest pain and palpitations.   Gastrointestinal:  Negative for abdominal pain, blood in stool, constipation, diarrhea, nausea and vomiting.   Endocrine: Negative for cold intolerance, heat intolerance and polydipsia.   Genitourinary:  Negative for

## 2024-04-18 ENCOUNTER — OFFICE VISIT (OUTPATIENT)
Dept: BARIATRICS/WEIGHT MGMT | Age: 44
End: 2024-04-18

## 2024-04-18 VITALS — WEIGHT: 236.8 LBS | HEIGHT: 66 IN | BODY MASS INDEX: 38.06 KG/M2

## 2024-04-18 DIAGNOSIS — E66.9 OBESITY (BMI 30-39.9): Primary | ICD-10-CM

## 2024-04-18 PROCEDURE — 90000 NO LOS: CPT | Performed by: NURSE PRACTITIONER

## 2024-04-22 ENCOUNTER — OFFICE VISIT (OUTPATIENT)
Dept: BARIATRICS/WEIGHT MGMT | Age: 44
End: 2024-04-22
Payer: COMMERCIAL

## 2024-04-22 VITALS
WEIGHT: 236 LBS | OXYGEN SATURATION: 98 % | DIASTOLIC BLOOD PRESSURE: 72 MMHG | BODY MASS INDEX: 37.93 KG/M2 | HEIGHT: 66 IN | HEART RATE: 77 BPM | SYSTOLIC BLOOD PRESSURE: 110 MMHG

## 2024-04-22 DIAGNOSIS — Z79.899 MEDICATION MANAGEMENT: ICD-10-CM

## 2024-04-22 DIAGNOSIS — E66.9 OBESITY (BMI 30-39.9): Primary | ICD-10-CM

## 2024-04-22 PROCEDURE — 99214 OFFICE O/P EST MOD 30 MIN: CPT | Performed by: NURSE PRACTITIONER

## 2024-04-22 RX ORDER — PHENTERMINE HYDROCHLORIDE 37.5 MG/1
37.5 TABLET ORAL
Qty: 30 TABLET | Refills: 0 | Status: SHIPPED | OUTPATIENT
Start: 2024-04-22 | End: 2024-05-22
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
## 2024-05-22 ENCOUNTER — OFFICE VISIT (OUTPATIENT)
Dept: BARIATRICS/WEIGHT MGMT | Age: 44
End: 2024-05-22
Payer: COMMERCIAL

## 2024-05-22 VITALS
BODY MASS INDEX: 38.28 KG/M2 | WEIGHT: 238.2 LBS | HEART RATE: 79 BPM | SYSTOLIC BLOOD PRESSURE: 108 MMHG | DIASTOLIC BLOOD PRESSURE: 68 MMHG | OXYGEN SATURATION: 97 % | HEIGHT: 66 IN

## 2024-05-22 DIAGNOSIS — Z79.899 MEDICATION MANAGEMENT: ICD-10-CM

## 2024-05-22 DIAGNOSIS — E66.9 OBESITY (BMI 30-39.9): Primary | ICD-10-CM

## 2024-05-22 PROCEDURE — 99214 OFFICE O/P EST MOD 30 MIN: CPT | Performed by: NURSE PRACTITIONER

## 2024-05-22 RX ORDER — LEVOTHYROXINE SODIUM 0.12 MG/1
250 TABLET ORAL DAILY
COMMUNITY
Start: 2024-05-02

## 2024-05-22 RX ORDER — HYDROXYCHLOROQUINE SULFATE 200 MG/1
200 TABLET, FILM COATED ORAL 2 TIMES DAILY
COMMUNITY
Start: 2024-05-14

## 2024-05-22 RX ORDER — PHENTERMINE HYDROCHLORIDE 37.5 MG/1
37.5 TABLET ORAL
Qty: 30 TABLET | Refills: 0 | Status: SHIPPED | OUTPATIENT
Start: 2024-05-22 | End: 2024-06-21

## 2024-05-22 NOTE — PROGRESS NOTES
Psychiatric:         Mood and Affect: Mood normal.         Behavior: Behavior normal.           ASSESSMENT:  1. Obesity (BMI 30-39.9)  - Continue tracking calories; about  0569-9498 daily  - Eat 5-6 small meals/ snacks daily  - 64 oz water daily  - Continue to increase activity as able    2. Medication management  - Slight increase in weight of 2.2 pounds   - Denies any side effects  - RX refill sent for 3rd month of Adipex  - Weight evaluation next month with goal of 234 pounds   - RTC one month    - phentermine (ADIPEX-P) 37.5 MG tablet; Take 1 tablet by mouth every morning (before breakfast) for 30 days. BMI 44. Max Daily Amount: 37.5 mg  Dispense: 30 tablet; Refill: 0      PLAN:    -Continue Adipex. RX REFILLED.    -BMI is over 30, or over 27 with weight-related risk factors.     -Patient demonstrated weight loss since last visit.    -Patient aware that weight must be lost at each visit or medication will be discontinued.     - Patient must lose 5% of body weight every 3 months to remain on medication.    -Patient is aware that in order to be successful, must combine Adipex with appropriate diet and exercise plan.     -Pt aware must continue to meet monthly in person while on this medication. F/u in one month.    -Check [place] Automated Rx Reporting System. Any concerns: NONE Reported     -If elderly or renal impairment, will use lower dose (15 mg daily) and avoid all together if GFR is less than 15. N/A      No orders of the defined types were placed in this encounter.       Orders Placed This Encounter   Medications    phentermine (ADIPEX-P) 37.5 MG tablet     Sig: Take 1 tablet by mouth every morning (before breakfast) for 30 days. BMI 44. Max Daily Amount: 37.5 mg     Dispense:  30 tablet     Refill:  0        Follow Up:  Return in about 1 month (around 6/22/2024).      WINNIE Gaffney - CNP

## 2024-06-19 ENCOUNTER — TELEPHONE (OUTPATIENT)
Dept: GASTROENTEROLOGY | Age: 44
End: 2024-06-19

## 2024-06-19 NOTE — TELEPHONE ENCOUNTER
[facility]-C-scope is pending auth. Clins attached.     Per fax from [facility]-no auth required for C-scope.

## 2024-07-10 ENCOUNTER — TELEPHONE (OUTPATIENT)
Dept: GASTROENTEROLOGY | Age: 44
End: 2024-07-10

## 2024-07-10 NOTE — TELEPHONE ENCOUNTER
Lm for pt to inform her PAT has been trying to reach her and if they are unable to contact her they will cancel her procedure on 7/12/24. I included PAT phone number in message.

## 2024-09-10 ENCOUNTER — OFFICE VISIT (OUTPATIENT)
Dept: FAMILY MEDICINE CLINIC | Age: 44
End: 2024-09-10
Payer: COMMERCIAL

## 2024-09-10 VITALS
SYSTOLIC BLOOD PRESSURE: 102 MMHG | HEIGHT: 66 IN | TEMPERATURE: 97.3 F | OXYGEN SATURATION: 97 % | RESPIRATION RATE: 17 BRPM | HEART RATE: 81 BPM | DIASTOLIC BLOOD PRESSURE: 62 MMHG | BODY MASS INDEX: 38.45 KG/M2

## 2024-09-10 DIAGNOSIS — E06.3 HASHIMOTO'S DISEASE: Primary | ICD-10-CM

## 2024-09-10 DIAGNOSIS — E55.9 VITAMIN D DEFICIENCY: ICD-10-CM

## 2024-09-10 DIAGNOSIS — M05.79 RHEUMATOID ARTHRITIS INVOLVING MULTIPLE SITES WITH POSITIVE RHEUMATOID FACTOR (HCC): ICD-10-CM

## 2024-09-10 DIAGNOSIS — E03.9 ACQUIRED HYPOTHYROIDISM: ICD-10-CM

## 2024-09-10 DIAGNOSIS — Z12.31 BREAST CANCER SCREENING BY MAMMOGRAM: ICD-10-CM

## 2024-09-10 DIAGNOSIS — J84.10 GRANULOMATOUS LUNG DISEASE (HCC): ICD-10-CM

## 2024-09-10 PROCEDURE — G2211 COMPLEX E/M VISIT ADD ON: HCPCS | Performed by: PHYSICIAN ASSISTANT

## 2024-09-10 PROCEDURE — 99214 OFFICE O/P EST MOD 30 MIN: CPT | Performed by: PHYSICIAN ASSISTANT

## 2024-09-10 SDOH — ECONOMIC STABILITY: FOOD INSECURITY: WITHIN THE PAST 12 MONTHS, YOU WORRIED THAT YOUR FOOD WOULD RUN OUT BEFORE YOU GOT MONEY TO BUY MORE.: NEVER TRUE

## 2024-09-10 SDOH — ECONOMIC STABILITY: INCOME INSECURITY: HOW HARD IS IT FOR YOU TO PAY FOR THE VERY BASICS LIKE FOOD, HOUSING, MEDICAL CARE, AND HEATING?: NOT HARD AT ALL

## 2024-09-10 SDOH — ECONOMIC STABILITY: FOOD INSECURITY: WITHIN THE PAST 12 MONTHS, THE FOOD YOU BOUGHT JUST DIDN'T LAST AND YOU DIDN'T HAVE MONEY TO GET MORE.: NEVER TRUE

## 2024-09-17 ENCOUNTER — LAB (OUTPATIENT)
Dept: FAMILY MEDICINE CLINIC | Age: 44
End: 2024-09-17
Payer: COMMERCIAL

## 2024-09-17 DIAGNOSIS — E03.9 ACQUIRED HYPOTHYROIDISM: ICD-10-CM

## 2024-09-17 PROCEDURE — 36415 COLL VENOUS BLD VENIPUNCTURE: CPT | Performed by: PHYSICIAN ASSISTANT

## 2024-09-18 LAB
ALBUMIN SERPL-MCNC: 4.2 G/DL (ref 3.4–5)
ALBUMIN/GLOB SERPL: 1.6 {RATIO} (ref 1.1–2.2)
ALP SERPL-CCNC: 54 U/L (ref 40–129)
ALT SERPL-CCNC: 21 U/L (ref 10–40)
ANION GAP SERPL CALCULATED.3IONS-SCNC: 10 MMOL/L (ref 3–16)
AST SERPL-CCNC: 18 U/L (ref 15–37)
BASOPHILS # BLD: 0 K/UL (ref 0–0.2)
BASOPHILS NFR BLD: 0.3 %
BILIRUB SERPL-MCNC: <0.2 MG/DL (ref 0–1)
BUN SERPL-MCNC: 11 MG/DL (ref 7–20)
CALCIUM SERPL-MCNC: 9.7 MG/DL (ref 8.3–10.6)
CHLORIDE SERPL-SCNC: 104 MMOL/L (ref 99–110)
CHOLEST SERPL-MCNC: 159 MG/DL (ref 0–199)
CO2 SERPL-SCNC: 26 MMOL/L (ref 21–32)
CREAT SERPL-MCNC: 0.8 MG/DL (ref 0.6–1.1)
DEPRECATED RDW RBC AUTO: 16.8 % (ref 12.4–15.4)
EOSINOPHIL # BLD: 0.3 K/UL (ref 0–0.6)
EOSINOPHIL NFR BLD: 3.3 %
GFR SERPLBLD CREATININE-BSD FMLA CKD-EPI: >90 ML/MIN/{1.73_M2}
GLUCOSE SERPL-MCNC: 85 MG/DL (ref 70–99)
HCT VFR BLD AUTO: 42 % (ref 36–48)
HDLC SERPL-MCNC: 41 MG/DL (ref 40–60)
HGB BLD-MCNC: 13.5 G/DL (ref 12–16)
LDL CHOLESTEROL: 84 MG/DL
LYMPHOCYTES # BLD: 2.8 K/UL (ref 1–5.1)
LYMPHOCYTES NFR BLD: 28.9 %
MCH RBC QN AUTO: 29 PG (ref 26–34)
MCHC RBC AUTO-ENTMCNC: 32.2 G/DL (ref 31–36)
MCV RBC AUTO: 90.1 FL (ref 80–100)
MONOCYTES # BLD: 0.8 K/UL (ref 0–1.3)
MONOCYTES NFR BLD: 8.3 %
NEUTROPHILS # BLD: 5.8 K/UL (ref 1.7–7.7)
NEUTROPHILS NFR BLD: 59.2 %
PLATELET # BLD AUTO: 417 K/UL (ref 135–450)
PMV BLD AUTO: 9.3 FL (ref 5–10.5)
POTASSIUM SERPL-SCNC: 4.2 MMOL/L (ref 3.5–5.1)
PROT SERPL-MCNC: 6.8 G/DL (ref 6.4–8.2)
RBC # BLD AUTO: 4.67 M/UL (ref 4–5.2)
SODIUM SERPL-SCNC: 140 MMOL/L (ref 136–145)
T4 FREE SERPL-MCNC: 0.3 NG/DL (ref 0.9–1.8)
TRIGL SERPL-MCNC: 170 MG/DL (ref 0–150)
TSH SERPL DL<=0.005 MIU/L-ACNC: 51.3 UIU/ML (ref 0.27–4.2)
VLDLC SERPL CALC-MCNC: 34 MG/DL
WBC # BLD AUTO: 9.7 K/UL (ref 4–11)

## 2024-09-26 ENCOUNTER — HOSPITAL ENCOUNTER (OUTPATIENT)
Dept: MAMMOGRAPHY | Age: 44
Discharge: HOME | End: 2024-09-26
Payer: COMMERCIAL

## 2024-09-26 VITALS — BODY MASS INDEX: 36.88 KG/M2 | WEIGHT: 235 LBS | HEIGHT: 67 IN

## 2024-09-26 DIAGNOSIS — Z12.31 BREAST CANCER SCREENING BY MAMMOGRAM: ICD-10-CM

## 2024-09-26 PROCEDURE — 77063 BREAST TOMOSYNTHESIS BI: CPT

## 2024-09-27 DIAGNOSIS — E03.9 ACQUIRED HYPOTHYROIDISM: Primary | ICD-10-CM

## 2024-09-27 RX ORDER — LEVOTHYROXINE SODIUM 300 UG/1
300 TABLET ORAL DAILY
Qty: 30 TABLET | Refills: 5 | Status: SHIPPED | OUTPATIENT
Start: 2024-09-27

## 2024-10-09 ENCOUNTER — PATIENT MESSAGE (OUTPATIENT)
Dept: FAMILY MEDICINE CLINIC | Age: 44
End: 2024-10-09

## 2024-10-09 DIAGNOSIS — E66.1 CLASS 3 DRUG-INDUCED OBESITY WITH SERIOUS COMORBIDITY AND BODY MASS INDEX (BMI) OF 40.0 TO 44.9 IN ADULT: Primary | ICD-10-CM

## 2024-10-09 DIAGNOSIS — R92.8 ABNORMAL MAMMOGRAM: ICD-10-CM

## 2024-10-09 DIAGNOSIS — E55.9 VITAMIN D DEFICIENCY: ICD-10-CM

## 2024-10-09 DIAGNOSIS — E66.813 CLASS 3 DRUG-INDUCED OBESITY WITH SERIOUS COMORBIDITY AND BODY MASS INDEX (BMI) OF 40.0 TO 44.9 IN ADULT: Primary | ICD-10-CM

## 2024-10-09 RX ORDER — METHOCARBAMOL 750 MG/1
50000 TABLET ORAL WEEKLY
Qty: 14 CAPSULE | Refills: 1 | Status: SHIPPED | OUTPATIENT
Start: 2024-10-09

## 2024-10-09 RX ORDER — PHENTERMINE HYDROCHLORIDE 37.5 MG/1
37.5 TABLET ORAL
Qty: 30 TABLET | Refills: 0 | Status: SHIPPED | OUTPATIENT
Start: 2024-10-09 | End: 2024-11-08

## 2024-10-17 ENCOUNTER — HOSPITAL ENCOUNTER (OUTPATIENT)
Dept: ULTRASOUND IMAGING | Age: 44
Discharge: HOME | End: 2024-10-17
Payer: COMMERCIAL

## 2024-10-17 ENCOUNTER — HOSPITAL ENCOUNTER (OUTPATIENT)
Dept: MAMMOGRAPHY | Age: 44
Discharge: HOME | End: 2024-10-17
Payer: COMMERCIAL

## 2024-10-17 DIAGNOSIS — R92.8 ABNORMAL MAMMOGRAM: ICD-10-CM

## 2024-10-17 PROCEDURE — 76642 ULTRASOUND BREAST LIMITED: CPT

## 2024-10-17 PROCEDURE — G0279 TOMOSYNTHESIS, MAMMO: HCPCS

## 2024-11-07 ENCOUNTER — LAB (OUTPATIENT)
Dept: FAMILY MEDICINE CLINIC | Age: 44
End: 2024-11-07
Payer: COMMERCIAL

## 2024-11-07 DIAGNOSIS — E03.9 ACQUIRED HYPOTHYROIDISM: ICD-10-CM

## 2024-11-07 DIAGNOSIS — E06.3 HASHIMOTO'S DISEASE: Primary | ICD-10-CM

## 2024-11-07 PROCEDURE — 36415 COLL VENOUS BLD VENIPUNCTURE: CPT | Performed by: PHYSICIAN ASSISTANT

## 2024-11-08 LAB
T4 FREE SERPL-MCNC: 2.7 NG/DL (ref 0.9–1.8)
TSH SERPL DL<=0.005 MIU/L-ACNC: 0.05 UIU/ML (ref 0.27–4.2)

## 2024-11-13 DIAGNOSIS — E03.9 ACQUIRED HYPOTHYROIDISM: Primary | ICD-10-CM

## 2024-11-13 RX ORDER — LEVOTHYROXINE SODIUM 150 UG/1
TABLET ORAL
Qty: 45 TABLET | Refills: 2 | Status: SHIPPED | OUTPATIENT
Start: 2024-11-13

## 2024-12-08 DIAGNOSIS — E03.9 ACQUIRED HYPOTHYROIDISM: ICD-10-CM

## 2024-12-09 RX ORDER — LEVOTHYROXINE SODIUM 150 UG/1
TABLET ORAL
Qty: 135 TABLET | Refills: 1 | Status: SHIPPED | OUTPATIENT
Start: 2024-12-09

## 2025-03-04 ENCOUNTER — COMMUNITY OUTREACH (OUTPATIENT)
Dept: FAMILY MEDICINE CLINIC | Age: 45
End: 2025-03-04

## 2025-03-04 NOTE — PROGRESS NOTES
Patient's HM shows they are overdue for Colorectal Screening.   Care Everywhere and  files searched.  No results to attach to order nor HM updated.     Colonoscopy was scheduled for 7/2024, cancelled, not rescheduled.

## 2025-05-16 ENCOUNTER — HOSPITAL ENCOUNTER (OUTPATIENT)
Dept: OCCUPATIONAL THERAPY | Age: 45
Setting detail: THERAPIES SERIES
Discharge: HOME | End: 2025-05-16
Payer: COMMERCIAL

## 2025-05-16 PROCEDURE — 97140 MANUAL THERAPY 1/> REGIONS: CPT

## 2025-05-16 PROCEDURE — 97166 OT EVAL MOD COMPLEX 45 MIN: CPT

## 2025-05-16 PROCEDURE — 97110 THERAPEUTIC EXERCISES: CPT

## 2025-05-16 NOTE — PROGRESS NOTES
Occupational Therapy Initial Assessment  Date:  2025    Patient Name: Joanne Harris  MRN: [id number]     :  1980     Treatment Diagnosis: M79.641  M79.642    Restrictions:  Restrictions/Precautions  Restrictions/Precautions: Surgical Protocols, General Precautions  Subjective:  General  Additional Pertinent Hx: Pt underwent Synovectomy of R hand tendon sheath including EPL, EDC of index  long  ring and small, EIP and EDM   L hand synovectomy of  Ring finger-2 tendons FDS and FDP  Diagnosis: RA    synovectomys B hands  M05.9  Referring Provider (secondary): Angle Cotto  surgeon     Social History: Pt lives with spouse, Runs a [employer] and is back to work as of 2 days ago.     Functional Status:Pt is able to do own self care. Some days are more difficult because of RA           Date of onset: Dx RA 3 years  Date of surgery: 15 days ago  Hand Dominance: Right  Chief complaint: Post surgery pain and weakness.     Pain:  4.5/10     Sensation: intact                                     RIGHT                                                                LEFT                             MMT PROM AROM Shoulder AROM PROM MMT      Flexion         Extension         Abduction         Internal Rot.         Ext. Rot.         Elbow & Forearm        WNL Flex / Ext WNL       WNL Supination WNL       WNL Pronation WNL        Wrist        WNL Flexion WNL       40 Extension 65       WNL Ulnar Dev. WNL       WNL Radial Dev. WNL        Thumb         WNL CMC Radial Abd.  WNL       WNL CMC Palmar Abd WNL       WNL MP WNL       WNL IP WNL       Finger Extension/Flexion   RIGHT=WFL    Index  Long Ring Small    MPs    (    ) (    ) (    ) (    )   PIPs    (    ) (    ) (    ) (    )   DIPs (    ) (    ) (    ) (    )          LEFT-1cm from palm in flex, ext WFL    Index  Long Ring Small    MPs    (    ) (    ) (    ) (    )   PIPs    (    ) (    ) (    ) (    )   DIPs (    ) (    ) (    ) (

## 2025-05-16 NOTE — FLOWSHEET NOTE
Occupational Therapy Out Patient Daily Treatment Note     [x][facility]      [address]     [address] [address] [phone]  [phone] [phone]  ______________________________________________________________________  Date:  2025  Patient Name:  Joanne Harris    :  1980  Restrictions/Precautions:  General  Diagnosis:   RA B hands with synovectomies  Extensor tendons R and FDS and FDP LRF  Treatment Diagnosis:   hand pain  Insurance/Certification information:  [facility]  Referring Physician:   Angle Sousa PA-C  Plan of care signed (Y/N):    Visit# / total visits: 1 /10  COVID screening questions were asked and patient attested that there had been no contact or symptoms    Pain level: 4.5/10     Subjective:   Prior Level of Function:  Nodules restricting movement pre-op  Patient Goals: Increase ROM and strength     Treatment Flowsheet   Right Left     See eval x x                                                                                                                                   Interventions/Modalities used:  [x] Therapeutic Exercise   [x] Modalities:  [x] Therapeutic Activity    [] Ultrasound [] Elec Stimulation   [] Total Motion Release    [] Fluido [] Kinesiotaping  [] Neuromuscular Re-education   [] Ionto [x] Coldpack/hotpack   [x] Instruction in HEP    Other:scar management and edema control    Objective Findings: See eval    Communication with other providers: POC to physician    Education provided to patient: Issued and instructed in HEP    Adverse Reactions to treatment: none noted    Time in:  1515  Time out:  1600  Timed treatment minutes:  30  Total treatment time:  45      If BWC Please Indicate Time In/Out  CPT Code Time In Time Out Total Min

## 2025-05-16 NOTE — PLAN OF CARE
[x][facility]      [][address]     [place] [id number] [phone] [phone] Fax:[phone]  ________________________________________________________________________    Physician:    Angle Sousa PA-C From: ZAIRE Santillan [facility]  Patient: Joanne Harris      : 1980  Diagnosis:   Synovectomys B hands for RA  Physician ICD 10 Code: M05.9   Treatment Diagnosis:  Hand pain and weakness  ICD10 tx code: M79.641   M79.642  Date: 2025     MRN: [id number]     Occupational Therapy Certification/Re-Certification Form  Dear Angle Sousa PA-C  The following patient has been evaluated for occupational therapy services and for therapy to continue, insurance requires physician review of the treatment plan initially and every 90 days. Please review the attached evaluation and/or summary of the patient's plan of care, and verify that you agree therapy should continue by signing the attached document and sending it back to our office.    Plan of Care/Treatment to date:  [x] Therapeutic Exercise   [x] Modalities:  [x] Therapeutic Activity    [] Ultrasound [] Elec Stimulation   [] Total Motion Release    [] Fluido [] Kinesiotaping  [] Neuromuscular Re-education   [] Ionto [x] Coldpack/hotpack   [x] Instruction in HEP    Other:  [x] Manual Therapy     [x] Scar management  [] Aquatic Therapy     [x] Edema control     Frequency/Duration:  # Days per week: [] 1 day # Weeks: [] 1 week [x] 5 weeks     [x] 2 days   [] 2 weeks [] 6 weeks     [] 3 days   [] 3 weeks [] 7 weeks     [] 4 days   [] 4 weeks [] 8 weeks         [] 9 weeks [] 10 weeks         [] 11 weeks [] 12 weeks    Rehab Potential/Progress: [] excellent [x] good [] fair  [] poor       Goals:  OutComes Score: QUICK DASH: 68.2   Goals:  Pt will decrease pain 2-3/10 B hands to increase

## 2025-05-19 ENCOUNTER — HOSPITAL ENCOUNTER (OUTPATIENT)
Dept: OCCUPATIONAL THERAPY | Age: 45
Setting detail: THERAPIES SERIES
Discharge: HOME | End: 2025-05-19
Payer: COMMERCIAL

## 2025-05-19 PROCEDURE — 97110 THERAPEUTIC EXERCISES: CPT

## 2025-05-19 PROCEDURE — 97140 MANUAL THERAPY 1/> REGIONS: CPT

## 2025-05-19 PROCEDURE — 97530 THERAPEUTIC ACTIVITIES: CPT

## 2025-05-19 NOTE — FLOWSHEET NOTE
Occupational Therapy Out Patient Daily Treatment Note     [x][facility]      [][address]     [address] [address] [phone]  [phone] [phone]  ______________________________________________________________________  Date:  2025  Patient Name:  Joanne Harris    :  1980  Restrictions/Precautions:  General  Diagnosis:   RA B hands with synovectomies  Extensor tendons R and FDS and FDP LRF  Treatment Diagnosis:   hand pain  Insurance/Certification information:  [facility]  Referring Physician:   Angle Sousa PA-C  Plan of care signed (Y/N):    Visit# / total visits: 2/10  COVID screening questions were asked and patient attested that there had been no contact or symptoms    Pain level: 4.5/10     Subjective: States has been doing HEP. Had gloves on earlier secondary to swelling.  Prior Level of Function:  Nodules restricting movement pre-op  Patient Goals: Increase ROM and strength     Treatment Flowsheet   Right Left   HP  alternating hands with ex x x   Scar mobilization x x   AROM B hands and wrists x x   Digiflex 1.5# x x   Pinch pin 1# x x   Edema control x                                                                                                 Interventions/Modalities used:  [x] Therapeutic Exercise   [x] Modalities:  [x] Therapeutic Activity    [] Ultrasound [] Elec Stimulation   [] Total Motion Release    [] Fluido [] Kinesiotaping  [] Neuromuscular Re-education   [] Ionto [x] Coldpack/hotpack   [x] Instruction in HEP    Other:scar management and edema control    Objective Findings: R wrist ext 45 LRF 0.5 cm from palm                      Communication with other providers: POC to physician    Education provided to patient: Issued and instructed in HEP    Adverse Reactions to treatment: none

## 2025-05-23 ENCOUNTER — HOSPITAL ENCOUNTER (OUTPATIENT)
Dept: OCCUPATIONAL THERAPY | Age: 45
Setting detail: THERAPIES SERIES
Discharge: HOME | End: 2025-05-23
Payer: COMMERCIAL

## 2025-05-23 PROCEDURE — 97530 THERAPEUTIC ACTIVITIES: CPT

## 2025-05-23 PROCEDURE — 97140 MANUAL THERAPY 1/> REGIONS: CPT

## 2025-05-23 PROCEDURE — 97110 THERAPEUTIC EXERCISES: CPT

## 2025-05-23 NOTE — FLOWSHEET NOTE
Occupational Therapy Out Patient Daily Treatment Note     [x][facility]      [address]     [address] [address] [phone]  [phone] [phone]  ______________________________________________________________________  Date:  2025  Patient Name:  Joanne Harris    :  1980  Restrictions/Precautions:  General  Diagnosis:   RA B hands with synovectomies  Extensor tendons R and FDS and FDP LRF  Treatment Diagnosis:   hand pain  Insurance/Certification information:  [facility]  Referring Physician:   Angle Sousa PA-C  Plan of care signed (Y/N):    Visit# / total visits: 3/10  COVID screening questions were asked and patient attested that there had been no contact or symptoms    Pain level: 4.5/10     Subjective: States has been able to use for work so tolerable. Not doing anything heavy  Prior Level of Function:  Nodules restricting movement pre-op  Patient Goals: Increase ROM and strength     Treatment Flowsheet   Right Left   HP  alternating hands with ex x x   Scar mobilization x X tool assisted-gentle   AROM B hands and wrists x x   Digiflex 1.5# x x   Pinch pin 1# x x   Edema control x                                                                                                 Interventions/Modalities used:  [x] Therapeutic Exercise   [x] Modalities:  [x] Therapeutic Activity    [] Ultrasound [] Elec Stimulation   [] Total Motion Release    [] Fluido [] Kinesiotaping  [] Neuromuscular Re-education   [] Ionto [x] Coldpack/hotpack   [x] Instruction in HEP    Other:scar management and edema control    Objective Findings: R wrist ext 45 LRF 0.5 cm from palm R  31.0  L  21.1                      Communication with other providers: POC to physician    Education provided to patient: Issued and instructed in

## 2025-05-27 ENCOUNTER — HOSPITAL ENCOUNTER (OUTPATIENT)
Dept: OCCUPATIONAL THERAPY | Age: 45
Setting detail: THERAPIES SERIES
Discharge: HOME | End: 2025-05-27
Payer: COMMERCIAL

## 2025-05-27 PROCEDURE — 97110 THERAPEUTIC EXERCISES: CPT

## 2025-05-27 PROCEDURE — 97140 MANUAL THERAPY 1/> REGIONS: CPT

## 2025-05-27 PROCEDURE — 97530 THERAPEUTIC ACTIVITIES: CPT

## 2025-05-27 NOTE — FLOWSHEET NOTE
Occupational Therapy Out Patient Daily Treatment Note     [x][facility]      [address]     [address] [address] [phone]  [phone] [phone]  ______________________________________________________________________  Date:  2025  Patient Name:  Joanne Harris    :  1980  Restrictions/Precautions:  General  Diagnosis:   RA B hands with synovectomies  Extensor tendons R and FDS and FDP LRF  Treatment Diagnosis:   hand pain  Insurance/Certification information:  [facility]  Referring Physician:   Angle Sousa PA-C  Plan of care signed (Y/N):    Visit# / total visits: 4/10  COVID screening questions were asked and patient attested that there had been no contact or symptoms    Pain level: 4/10     Subjective: States is feeling better Swelling down.  Prior Level of Function:  Nodules restricting movement pre-op  Patient Goals: Increase ROM and strength     Treatment Flowsheet   Right Left   HP  alternating hands with ex x x   Scar mobilization x X tool assisted-gentle   AROM B hands and wrists x x   Digiflex 1.5# x x   Pinch pin 1# x x   Edema control x                                                                                                 Interventions/Modalities used:  [x] Therapeutic Exercise   [x] Modalities:  [x] Therapeutic Activity    [] Ultrasound [] Elec Stimulation   [] Total Motion Release    [] Fluido [] Kinesiotaping  [] Neuromuscular Re-education   [] Ionto [x] Coldpack/hotpack   [x] Instruction in HEP    Other:scar management and edema control    Objective Findings: R wrist ext 45 LRF 0.5 cm from palm R  28.8  L  19.6                      Communication with other providers: POC to physician    Education provided to patient: Issued and instructed in HEP    Adverse Reactions to treatment:

## 2025-05-30 ENCOUNTER — HOSPITAL ENCOUNTER (OUTPATIENT)
Dept: OCCUPATIONAL THERAPY | Age: 45
Discharge: HOME | End: 2025-05-30

## 2025-05-30 NOTE — FLOWSHEET NOTE
Occupational Therapy  Cancellation/No-show Note  Patient Name:  Joanne Harris  :  1980   Date:  2025  Cancelled visits to date: 1  No-shows to date: 0    For today's appointment patient:  [x]    Cancelled  []    Rescheduled appointment  []    No-show     Reason given by patient:  []    Patient ill  []    Conflicting appointment  []    No transportation    []    Conflict with work  []    No reason given  [x]    Other:     Comments:  Family emergency    Electronically signed by:  Dalia Mustafa OT, OTR/L, 2025, 10:41 AM

## 2025-06-02 ENCOUNTER — HOSPITAL ENCOUNTER (OUTPATIENT)
Dept: OCCUPATIONAL THERAPY | Age: 45
Setting detail: THERAPIES SERIES
Discharge: HOME | End: 2025-06-02
Payer: COMMERCIAL

## 2025-06-02 PROCEDURE — 97110 THERAPEUTIC EXERCISES: CPT

## 2025-06-02 PROCEDURE — 97530 THERAPEUTIC ACTIVITIES: CPT

## 2025-06-02 PROCEDURE — 97140 MANUAL THERAPY 1/> REGIONS: CPT

## 2025-06-02 NOTE — FLOWSHEET NOTE
Occupational Therapy Out Patient Daily Treatment Note     [x][facility]      [address]     [address] [address] [phone]  [phone] [phone]  ______________________________________________________________________  Date:  2025  Patient Name:  Joanne Harris    :  1980  Restrictions/Precautions:  General  Diagnosis:   RA B hands with synovectomies  Extensor tendons R and FDS and FDP LRF  Treatment Diagnosis:   hand pain  Insurance/Certification information:  [facility]  Referring Physician:   Angle Sousa PA-C  Plan of care signed (Y/N):    Visit# / total visits: 5/10  COVID screening questions were asked and patient attested that there had been no contact or symptoms    Pain level: 4/10     Subjective: States is has some new nodules on L wrist. Sees doctor on the 10th  Prior Level of Function:  Nodules restricting movement pre-op  Patient Goals: Increase ROM and strength     Treatment Flowsheet   Right Left   HP  alternating hands with ex x x   Scar mobilization x X tool assisted-gentle   AROM B hands and wrists x x   Digiflex 1.5# x x   Pinch pin 1# x x   Edema control x                                                                                                 Interventions/Modalities used:  [x] Therapeutic Exercise   [x] Modalities:  [x] Therapeutic Activity    [] Ultrasound [] Elec Stimulation   [] Total Motion Release    [] Fluido [] Kinesiotaping  [] Neuromuscular Re-education   [] Ionto [x] Coldpack/hotpack   [x] Instruction in HEP    Other:scar management and edema control    Objective Findings: R wrist ext 47 flex WNL LRF 0.25 cm from palm R  28.8  L  19.6-last session                      Communication with other providers: POC to physician    Education provided to patient: Issued and

## 2025-06-04 ENCOUNTER — HOSPITAL ENCOUNTER (OUTPATIENT)
Dept: OCCUPATIONAL THERAPY | Age: 45
Setting detail: THERAPIES SERIES
Discharge: HOME | End: 2025-06-04
Payer: COMMERCIAL

## 2025-06-04 PROCEDURE — 97110 THERAPEUTIC EXERCISES: CPT

## 2025-06-04 PROCEDURE — 97140 MANUAL THERAPY 1/> REGIONS: CPT

## 2025-06-04 NOTE — FLOWSHEET NOTE
Occupational Therapy Out Patient Daily Treatment Note     [x][facility]      [address]     [address] [address] [phone]  [phone] [phone]  ______________________________________________________________________  Date:  2025  Patient Name:  Joanne Harris    :  1980  Restrictions/Precautions:  General  Diagnosis:   RA B hands with synovectomies  Extensor tendons R and FDS and FDP LRF  Treatment Diagnosis:   hand pain  Insurance/Certification information:  [facility]  Referring Physician:   Angle Sousa PA-C  Plan of care signed (Y/N):    Visit# / total visits: 6/10  COVID screening questions were asked and patient attested that there had been no contact or symptoms    Pain level: 6/10     Subjective:  Is currently having a flair up. Sees doctor on the 10th  Prior Level of Function:  Nodules restricting movement pre-op  Patient Goals: Increase ROM and strength     Treatment Flowsheet   Right Left   HP  alternating hands with ex x x   Scar mobilization x X tool assisted-gentle   AROM B hands and wrists x x   Digiflex 1.5# x x   Pinch pin 1# x x   Edema control x x     Wrist flex/ext w/ 8oz weight  x x                                                                                         Interventions/Modalities used:  [x] Therapeutic Exercise   [x] Modalities:  [x] Therapeutic Activity    [] Ultrasound [] Elec Stimulation   [] Total Motion Release    [] Fluido [] Kinesiotaping  [] Neuromuscular Re-education   [] Ionto [x] Coldpack/hotpack   [x] Instruction in HEP    Other:scar management and edema control    Objective Findings:  R  34.9  L  16.8      Communication with other providers: POC to physician    Education provided to patient: Issued and instructed in HEP    Adverse Reactions to treatment: none

## 2025-06-09 ENCOUNTER — HOSPITAL ENCOUNTER (OUTPATIENT)
Dept: OCCUPATIONAL THERAPY | Age: 45
Setting detail: THERAPIES SERIES
Discharge: HOME | End: 2025-06-09
Payer: COMMERCIAL

## 2025-06-09 PROCEDURE — 97530 THERAPEUTIC ACTIVITIES: CPT

## 2025-06-09 PROCEDURE — 97140 MANUAL THERAPY 1/> REGIONS: CPT

## 2025-06-09 PROCEDURE — 97110 THERAPEUTIC EXERCISES: CPT

## 2025-06-09 NOTE — FLOWSHEET NOTE
Occupational Therapy Out Patient Daily Treatment Note     [x][facility]      [address]     [address] [address] [phone]  [phone] [phone]  ______________________________________________________________________  Date:  2025  Patient Name:  Joanne Harris    :  1980  Restrictions/Precautions:  General  Diagnosis:   RA B hands with synovectomies  Extensor tendons R and FDS and FDP LRF  Treatment Diagnosis:   hand pain  Insurance/Certification information:  [facility]  Referring Physician:   Angle Sousa PA-C  Plan of care signed (Y/N):    Visit# / total visits: 7/10  COVID screening questions were asked and patient attested that there had been no contact or symptoms    Pain level: 3.5/10   aa  Subjective: She's her doctor tomorrow   Prior Level of Function:  Nodules restricting movement pre-op  Patient Goals: Increase ROM and strength     Treatment Flowsheet   Right Left   HP  alternating hands with ex x x   Scar mobilization x X tool assisted-gentle   AROM B hands and wrists x x   Digiflex 1.5# x x   Pinch pin 1# x x   Edema control x x     Wrist flex/ext w/ 8oz weight  x x     Sup/pro w/ hammer only  x x    Roylan tool board abraham side  x x                                                                           Interventions/Modalities used:  [x] Therapeutic Exercise   [x] Modalities:  [x] Therapeutic Activity    [] Ultrasound [] Elec Stimulation   [] Total Motion Release    [] Fluido [] Kinesiotaping  [] Neuromuscular Re-education   [] Ionto [x] Coldpack/hotpack   [x] Instruction in HEP    Other:scar management and edema control    Objective Findings:  R  44.8  L  19.1      Communication with other providers: POC to physician    Education provided to patient: Issued and instructed in HEP    Adverse

## 2025-06-13 ENCOUNTER — HOSPITAL ENCOUNTER (OUTPATIENT)
Dept: OCCUPATIONAL THERAPY | Age: 45
Setting detail: THERAPIES SERIES
Discharge: HOME | End: 2025-06-13
Payer: COMMERCIAL

## 2025-06-13 PROCEDURE — 97530 THERAPEUTIC ACTIVITIES: CPT

## 2025-06-13 PROCEDURE — 97140 MANUAL THERAPY 1/> REGIONS: CPT

## 2025-06-13 PROCEDURE — 97110 THERAPEUTIC EXERCISES: CPT

## 2025-06-13 NOTE — FLOWSHEET NOTE
Occupational Therapy Out Patient Daily Treatment Note     [x][facility]      [address]     [address] [address] [phone]  [phone] [phone]  ______________________________________________________________________  Date:  2025  Patient Name:  Joanne Harris    :  1980  Restrictions/Precautions:  General  Diagnosis:   RA B hands with synovectomies  Extensor tendons R and FDS and FDP LRF  Treatment Diagnosis:   hand pain  Insurance/Certification information:  [facility]  Referring Physician:   Angle Sousa PA-C  Plan of care signed (Y/N):    Visit# / total visits: 8/10  COVID screening questions were asked and patient attested that there had been no contact or symptoms    Pain level: 4/10   aa  Subjective: Will be having surgery again in the fall, for abraham ankles and her L wrist. Had some numbness when she woke up today   Prior Level of Function:  Nodules restricting movement pre-op  Patient Goals: Increase ROM and strength     Treatment Flowsheet   Right Left   HP  alternating hands with ex x x   Scar mobilization x X tool assisted-gentle   AROM B hands and wrists x x   Digiflex  X Red  X Yellow    Pinch pin  X Red  X Yellow    Edema control x x     Wrist flex/ext w/ 1# weight  x x     Sup/pro w/ hammer only  x x    Roylan tool board abraham side        Bulk yellow putty, finding removing 20 glass beads                                                                       Interventions/Modalities used:  [x] Therapeutic Exercise   [x] Modalities:  [x] Therapeutic Activity    [] Ultrasound [] Elec Stimulation   [] Total Motion Release    [] Fluido [] Kinesiotaping  [] Neuromuscular Re-education   [] Ionto [x] Coldpack/hotpack   [x] Instruction in HEP    Other:scar management and edema control    Objective Findings:  KWABENA

## 2025-06-17 ENCOUNTER — HOSPITAL ENCOUNTER (OUTPATIENT)
Dept: OCCUPATIONAL THERAPY | Age: 45
Setting detail: THERAPIES SERIES
Discharge: HOME | End: 2025-06-17
Payer: COMMERCIAL

## 2025-06-17 PROCEDURE — 97110 THERAPEUTIC EXERCISES: CPT

## 2025-06-17 PROCEDURE — 97530 THERAPEUTIC ACTIVITIES: CPT

## 2025-06-17 PROCEDURE — 97140 MANUAL THERAPY 1/> REGIONS: CPT

## 2025-06-17 NOTE — FLOWSHEET NOTE
Occupational Therapy Out Patient Daily Treatment Note     [x][facility]      [address]     [address] [address] [phone]  [phone] [phone]  ______________________________________________________________________  Date:  2025  Patient Name:  Joanne Harris    :  1980  Restrictions/Precautions:  General  Diagnosis:   RA B hands with synovectomies  Extensor tendons R and FDS and FDP LRF  Treatment Diagnosis:   hand pain  Insurance/Certification information:  [facility]  Referring Physician:   Angle Sousa PA-C  Plan of care signed (Y/N):    Visit# / total visits: 9/10  COVID screening questions were asked and patient attested that there had been no contact or symptoms    Pain level: 4/10   aa  Subjective: Will be having surgery again in the fall, for abraham ankles and her L wrist. Had some numbness when she woke up today   Prior Level of Function:  Nodules restricting movement pre-op  Patient Goals: Increase ROM and strength     Treatment Flowsheet   Right Left   HP  alternating hands with ex x x   Scar mobilization x X tool assisted-gentle   AROM B hands and wrists x x   Digiflex  X Red  X Yellow    Pinch pin  X Red  X Yellow    Edema control x x     Wrist flex/ext w/ 1# weight  x x     Sup/pro w/ hammer only  x x    Roylan tool board abraham side        Red putty with beads x x                                                                    Interventions/Modalities used:  [x] Therapeutic Exercise   [x] Modalities:  [x] Therapeutic Activity    [] Ultrasound [] Elec Stimulation   [] Total Motion Release    [] Fluido [] Kinesiotaping  [] Neuromuscular Re-education   [] Ionto [x] Coldpack/hotpack   [x] Instruction in HEP    Other:scar management and edema control    Objective Findings:  R  49.1 L  27.2  R

## 2025-06-20 ENCOUNTER — HOSPITAL ENCOUNTER (OUTPATIENT)
Dept: OCCUPATIONAL THERAPY | Age: 45
Setting detail: THERAPIES SERIES
Discharge: HOME | End: 2025-06-20
Payer: COMMERCIAL

## 2025-06-20 PROCEDURE — 97140 MANUAL THERAPY 1/> REGIONS: CPT

## 2025-06-20 PROCEDURE — 97110 THERAPEUTIC EXERCISES: CPT

## 2025-06-20 PROCEDURE — 97530 THERAPEUTIC ACTIVITIES: CPT

## 2025-06-20 NOTE — PLAN OF CARE
[x][facility]      [address]     [place] [id number] [phone] [phone] Fax:[phone]  ________________________________________________________________________    Physician:    Angle Sousa PA-C From: ZAIRE Santillan [facility]  Patient: Joanne Harris      : 1980  Diagnosis:   Synovectomys B hands for RA  Physician ICD 10 Code: M05.9   Treatment Diagnosis:  Hand pain and weakness  ICD10 tx code: M79.641   M79.642  Date: 2025     MRN: [id number]     Occupational Therapy Certification/Re-Certification Form  Dear Angle Sousa PA-C  The following patient has been evaluated for occupational therapy services and for therapy to continue, insurance requires physician review of the treatment plan initially and every 90 days. Please review the attached evaluation and/or summary of the patient's plan of care, and verify that you agree therapy should continue by signing the attached document and sending it back to our office.    Plan of Care/Treatment to date:  [x] Therapeutic Exercise   [x] Modalities:  [x] Therapeutic Activity    [] Ultrasound [] Elec Stimulation   [] Total Motion Release    [] Fluido [] Kinesiotaping  [] Neuromuscular Re-education   [] Ionto [x] Coldpack/hotpack   [x] Instruction in HEP    Other:  [x] Manual Therapy     [x] Scar management  [] Aquatic Therapy     [x] Edema control     Frequency/Duration:  # Days per week: [] 1 day # Weeks: [] 1 week [] 5 weeks     [x] 2 days   [] 2 weeks [] 6 weeks     [] 3 days   [x] 3 weeks [] 7 weeks     [] 4 days   [] 4 weeks [] 8 weeks         [] 9 weeks [] 10 weeks         [] 11 weeks [] 12 weeks    Rehab Potential/Progress: [] excellent [x] good [] fair  [] poor   Continue toward goals    Goals:  OutComes Score: QUICK DASH: 68.2   Goals:  Pt will decrease pain 2-3/10 B

## 2025-06-20 NOTE — FLOWSHEET NOTE
Occupational Therapy Out Patient Daily Treatment Note     [x][facility]      [][address]     [address] [address] [phone]  [phone] [phone]  ______________________________________________________________________  Date:  2025  Patient Name:  Joanne Harris    :  1980  Restrictions/Precautions:  General  Diagnosis:   RA B hands with synovectomies  Extensor tendons R and FDS and FDP LRF  Treatment Diagnosis:   hand pain  Insurance/Certification information:  [facility]  Referring Physician:   Angle Sousa PA-C  Plan of care signed (Y/N):    Visit# / total visits: 10/16  COVID screening questions were asked and patient attested that there had been no contact or symptoms    Pain level: 4/10   aa  Subjective: Pt feels will benefit from more scar tissue massage and strrengthening. Plan to extend treatment and goals  Prior Level of Function:  Nodules restricting movement pre-op  Patient Goals: Increase ROM and strength     Treatment Flowsheet   Right Left   HP  alternating hands with ex x x   Scar mobilization x X tool assisted-gentle   AROM B hands and wrists x x   Digiflex  X Red  X Yellow    Pinch pin  X Red  X Yellow    Edema control x x     Wrist flex/ext w/ 1# weight  x x     Sup/pro w/ hammer only  x x    Roylan tool board abraham side        Red putty with beads x x                                                                    Interventions/Modalities used:  [x] Therapeutic Exercise   [x] Modalities:  [x] Therapeutic Activity    [] Ultrasound [] Elec Stimulation   [] Total Motion Release    [] Fluido [] Kinesiotaping  [] Neuromuscular Re-education   [] Ionto [x] Coldpack/hotpack   [x] Instruction in HEP    Other:scar management and edema control    Objective Findings:  R  35.8 L  21.4

## 2025-06-25 ENCOUNTER — HOSPITAL ENCOUNTER (OUTPATIENT)
Dept: OCCUPATIONAL THERAPY | Age: 45
Discharge: HOME | End: 2025-06-25

## 2025-06-25 NOTE — FLOWSHEET NOTE
Occupational Therapy  Cancellation/No-show Note  Patient Name:  Joanne Harris  :  1980   Date:  2025  Cancelled visits to date: 1  No-shows to date: 0    For today's appointment patient:  [x]    Cancelled  []    Rescheduled appointment  []    No-show     Reason given by patient:  []    Patient ill  []    Conflicting appointment  []    No transportation    [x]    Conflict with work  []    No reason given  []    Other:     Comments:      Electronically signed by:  Dalia Mustafa OT, OTR/L, 2025, 4:12 PM

## 2025-07-07 ENCOUNTER — HOSPITAL ENCOUNTER (OUTPATIENT)
Dept: OCCUPATIONAL THERAPY | Age: 45
Setting detail: THERAPIES SERIES
Discharge: HOME | End: 2025-07-07
Payer: COMMERCIAL

## 2025-07-07 PROCEDURE — 97140 MANUAL THERAPY 1/> REGIONS: CPT

## 2025-07-07 PROCEDURE — 97110 THERAPEUTIC EXERCISES: CPT

## 2025-07-07 PROCEDURE — 97530 THERAPEUTIC ACTIVITIES: CPT

## 2025-07-07 NOTE — FLOWSHEET NOTE
Occupational Therapy Out Patient Daily Treatment Note     [x][facility]      [][address]     [address] [address] [phone]  [phone] [phone]  ______________________________________________________________________  Date:  2025  Patient Name:  Joanne Harris    :  1980  Restrictions/Precautions:  General  Diagnosis:   RA B hands with synovectomies  Extensor tendons R and FDS and FDP LRF  Treatment Diagnosis:   hand pain  Insurance/Certification information:  [facility]  Referring Physician:   Angle Sousa PA-C  Plan of care signed (Y/N):    Visit# / total visits:   COVID screening questions were asked and patient attested that there had been no contact or symptoms    Pain level: 4/10   aa  Subjective: Pt feels will bene  Prior Level of Function:  Nodules restricting movement pre-op  Patient Goals: Increase ROM and strength     Treatment Flowsheet   Right Left   HP  alternating hands with ex x x   Scar mobilization x X tool assisted-gentle   AROM B hands and wrists x x   Digiflex  X Red  X Yellow    Pinch pin  X Red  X Yellow    Edema control x x     Wrist flex/ext w/ 1# weight  x x     Sup/pro w/ hammer only  x x    Roylan tool board abraham side        Red putty with beads                                                                      Interventions/Modalities used:  [x] Therapeutic Exercise   [x] Modalities:  [x] Therapeutic Activity    [] Ultrasound [] Elec Stimulation   [] Total Motion Release    [] Fluido [] Kinesiotaping  [] Neuromuscular Re-education   [] Ionto [x] Coldpack/hotpack   [x] Instruction in HEP    Other:scar management and edema control    Objective Findings:  R  40.2 L  28.6     Communication with other providers: POC to physician    Education provided to patient: Issued and

## 2025-07-09 ENCOUNTER — HOSPITAL ENCOUNTER (OUTPATIENT)
Dept: OCCUPATIONAL THERAPY | Age: 45
Setting detail: THERAPIES SERIES
Discharge: HOME | End: 2025-07-09
Payer: COMMERCIAL

## 2025-07-09 PROCEDURE — 97140 MANUAL THERAPY 1/> REGIONS: CPT

## 2025-07-09 PROCEDURE — 97110 THERAPEUTIC EXERCISES: CPT

## 2025-07-09 PROCEDURE — 97530 THERAPEUTIC ACTIVITIES: CPT

## 2025-07-09 NOTE — FLOWSHEET NOTE
Occupational Therapy Out Patient Daily Treatment Note     [x][facility]      [][address]     [address] [address] [phone]  [phone] [phone]  ______________________________________________________________________  Date:  2025  Patient Name:  Joanne Harris    :  1980  Restrictions/Precautions:  General  Diagnosis:   RA B hands with synovectomies  Extensor tendons R and FDS and FDP LRF  Treatment Diagnosis:   hand pain  Insurance/Certification information:  [facility]  Referring Physician:   Angle Sousa PA-C  Plan of care signed (Y/N):    Visit# / total visits:   COVID screening questions were asked and patient attested that there had been no contact or symptoms    Pain level: 3-4/10   aa  Subjective: Pt feels will bene  Prior Level of Function:  Nodules restricting movement pre-op  Patient Goals: Increase ROM and strength     Treatment Flowsheet   Right Left   HP  alternating hands with ex x x   Scar mobilization x X tool assisted-gentle   AROM B hands and wrists x x   Digiflex  X Red  X Yellow    Pinch pin  X Red  X Yellow    Edema control x x     Wrist flex/ext w/ 1# weight  x x     Sup/pro w/ hammer only  x x    Roylan tool board abraham side        Red putty with beads                                                                      Interventions/Modalities used:  [x] Therapeutic Exercise   [x] Modalities:  [x] Therapeutic Activity    [] Ultrasound [] Elec Stimulation   [] Total Motion Release    [] Fluido [] Kinesiotaping  [] Neuromuscular Re-education   [] Ionto [x] Coldpack/hotpack   [x] Instruction in HEP    Other:scar management and edema control    Objective Findings:  R  42.1 L  32.4     Communication with other providers: POC to physician    Education provided to patient: Issued and

## 2025-07-14 ENCOUNTER — HOSPITAL ENCOUNTER (OUTPATIENT)
Dept: OCCUPATIONAL THERAPY | Age: 45
Setting detail: THERAPIES SERIES
Discharge: HOME | End: 2025-07-14
Payer: COMMERCIAL

## 2025-07-14 PROCEDURE — 97110 THERAPEUTIC EXERCISES: CPT

## 2025-07-14 PROCEDURE — 97530 THERAPEUTIC ACTIVITIES: CPT

## 2025-07-14 PROCEDURE — 97140 MANUAL THERAPY 1/> REGIONS: CPT

## 2025-07-14 NOTE — FLOWSHEET NOTE
Occupational Therapy Out Patient Daily Treatment Note     [x][facility]      [][address]     [address] [address] [phone]  [phone] [phone]  ______________________________________________________________________  Date:  2025  Patient Name:  Joanne Harris    :  1980  Restrictions/Precautions:  General  Diagnosis:   RA B hands with synovectomies  Extensor tendons R and FDS and FDP LRF  Treatment Diagnosis:   hand pain  Insurance/Certification information:  [facility]  Referring Physician:   Angle Sousa PA-C  Plan of care signed (Y/N):    Visit# / total visits:   COVID screening questions were asked and patient attested that there had been no contact or symptoms    Pain level: 3-4/10   aa  Subjective: Pt feels will bene  Prior Level of Function:  Nodules restricting movement pre-op  Patient Goals: Increase ROM and strength     Treatment Flowsheet   Right Left   HP  alternating hands with ex x x   Scar mobilization x X tool assisted-gentle   AROM B hands and wrists x x   Digiflex 3# X Red  X Yellow    Pinch pin 2# X Red  X Yellow    Edema control x x     Wrist flex/ext w/ 1# weight  x x     Sup/pro w/ hammer only  x x    Roylan tool board abraham side        Red putty with beads                                                                      Interventions/Modalities used:  [x] Therapeutic Exercise   [x] Modalities:  [x] Therapeutic Activity    [] Ultrasound [] Elec Stimulation   [] Total Motion Release    [] Fluido [] Kinesiotaping  [] Neuromuscular Re-education   [] Ionto [x] Coldpack/hotpack   [x] Instruction in HEP    Other:scar management and edema control    Objective Findings:  R  42.1 L  32.0     Communication with other providers: POC to physician    Education provided to patient: Issued

## 2025-07-16 ENCOUNTER — HOSPITAL ENCOUNTER (OUTPATIENT)
Dept: OCCUPATIONAL THERAPY | Age: 45
Discharge: HOME | End: 2025-07-16

## 2025-07-16 NOTE — FLOWSHEET NOTE
Occupational Therapy  Cancellation/No-show Note  Patient Name:  Joanne Harris  :  1980   Date:  2025  Cancelled visits to date: 2  No-shows to date: 0    For today's appointment patient:  [x]    Cancelled  []    Rescheduled appointment  []    No-show     Reason given by patient:  []    Patient ill  []    Conflicting appointment  []    No transportation    [x]    Conflict with work  []    No reason given  []    Other:     Comments:      Electronically signed by:  Dalia Mustafa OT, OTR/L, 2025, 2:46 PM

## 2025-07-18 ENCOUNTER — HOSPITAL ENCOUNTER (OUTPATIENT)
Dept: OCCUPATIONAL THERAPY | Age: 45
Setting detail: THERAPIES SERIES
Discharge: HOME | End: 2025-07-18
Payer: COMMERCIAL

## 2025-07-18 PROCEDURE — 97530 THERAPEUTIC ACTIVITIES: CPT

## 2025-07-18 PROCEDURE — 97110 THERAPEUTIC EXERCISES: CPT

## 2025-07-18 PROCEDURE — 97140 MANUAL THERAPY 1/> REGIONS: CPT

## 2025-07-18 NOTE — FLOWSHEET NOTE
Occupational Therapy Out Patient Daily Treatment Note     [x][facility]      [][address]     [address] [address] [phone]  [phone] [phone]  ______________________________________________________________________  Date:  2025  Patient Name:  Joanne Harris    :  1980  Restrictions/Precautions:  General  Diagnosis:   RA B hands with synovectomies  Extensor tendons R and FDS and FDP LRF  Treatment Diagnosis:   hand pain  Insurance/Certification information:  [facility]  Referring Physician:   Angle Sousa PA-C  Plan of care signed (Y/N):    Visit# / total visits:   COVID screening questions were asked and patient attested that there had been no contact or symptoms    Pain level: 3/10   aa  Subjective: Pt states ring finger scar feeling better  Prior Level of Function:  Nodules restricting movement pre-op  Patient Goals: Increase ROM and strength     Treatment Flowsheet   Right Left   HP  alternating hands with ex x x   Scar mobilization x X tool assisted-gentle   AROM B hands and wrists x x   Digiflex 3# X Red  X Yellow    Pinch pin 2# X Red  X Yellow    Edema control x x     Wrist flex/ext w/ 1# weight  x x     Sup/pro w/ hammer only  x x    Roylan tool board abraham side        Red putty with beads                                                                      Interventions/Modalities used:  [x] Therapeutic Exercise   [x] Modalities:  [x] Therapeutic Activity    [] Ultrasound [] Elec Stimulation   [] Total Motion Release    [] Fluido [] Kinesiotaping  [] Neuromuscular Re-education   [] Ionto [x] Coldpack/hotpack   [x] Instruction in HEP    Other:scar management and edema control    Objective Findings:  R  45.9 L  29.7     Communication with other providers: POC to physician    Education provided

## 2025-07-25 ENCOUNTER — HOSPITAL ENCOUNTER (OUTPATIENT)
Dept: OCCUPATIONAL THERAPY | Age: 45
Setting detail: THERAPIES SERIES
Discharge: HOME | End: 2025-07-25
Payer: COMMERCIAL

## 2025-07-25 PROCEDURE — 97140 MANUAL THERAPY 1/> REGIONS: CPT

## 2025-07-25 PROCEDURE — 97110 THERAPEUTIC EXERCISES: CPT

## 2025-07-25 PROCEDURE — 97530 THERAPEUTIC ACTIVITIES: CPT

## 2025-07-25 NOTE — FLOWSHEET NOTE
Occupational Therapy Out Patient Discharge Summary    [x][facility]      [][address]     [address] [address] [phone]  [phone] [phone]  ______________________________________________________________________  Date:  2025  Patient Name:  Joanne Harris    :  1980  Restrictions/Precautions:  General  Diagnosis:   RA B hands with synovectomies  Extensor tendons R and FDS and FDP LRF  Treatment Diagnosis:   hand pain  Insurance/Certification information:  [facility]  Referring Physician:   Angle Sousa PA-C  Plan of care signed (Y/N):    Visit# / total visits: 15/16  COVID screening questions were asked and patient attested that there had been no contact or symptoms    Pain level: 3/10 -Has had a flare the past 2 days  aa  Subjective: Pt states she is definitely a lot better  Prior Level of Function:  Nodules restricting movement pre-op  Patient Goals: Increase ROM and strength     Treatment Flowsheet   Right Left   HP  alternating hands with ex x x   Scar mobilization x X tool assisted-gentle   AROM B hands and wrists x x   Digiflex 3# X Red  X Red   Pinch pin 2# X Red  X Red   Edema control x x     Wrist flex/ext w/ 1# weight  x x     Sup/pro w/ hammer only  x x    Roylan tool board abraham side        Red putty with beads                                                                      Interventions/Modalities used:  [x] Therapeutic Exercise   [x] Modalities:  [x] Therapeutic Activity    [] Ultrasound [] Elec Stimulation   [] Total Motion Release    [] Fluido [] Kinesiotaping  [] Neuromuscular Re-education   [] Ionto [x] Coldpack/hotpack   [x] Instruction in HEP    Other:scar management and edema control    Objective Findings:  R  44.7 L  34.6# AROM fingers and wrists WFL to WNL. Scars have flattened.

## 2025-08-24 DIAGNOSIS — E55.9 VITAMIN D DEFICIENCY: ICD-10-CM

## 2025-08-26 RX ORDER — METHOCARBAMOL 750 MG/1
1 TABLET ORAL WEEKLY
Qty: 12 CAPSULE | Refills: 2 | Status: SHIPPED | OUTPATIENT
Start: 2025-08-26